# Patient Record
Sex: FEMALE | Race: BLACK OR AFRICAN AMERICAN | NOT HISPANIC OR LATINO | Employment: FULL TIME | URBAN - METROPOLITAN AREA
[De-identification: names, ages, dates, MRNs, and addresses within clinical notes are randomized per-mention and may not be internally consistent; named-entity substitution may affect disease eponyms.]

---

## 2020-01-13 ENCOUNTER — TELEPHONE (OUTPATIENT)
Dept: PSYCHIATRY | Facility: CLINIC | Age: 50
End: 2020-01-13

## 2020-01-13 NOTE — TELEPHONE ENCOUNTER
----- Message from Jada Srinivasan sent at 1/13/2020 10:50 AM CST -----  Contact: Pt   Pt called in regards to scheduling a appointment, Pt can be reached at [phone].

## 2020-01-17 ENCOUNTER — LAB VISIT (OUTPATIENT)
Dept: LAB | Facility: HOSPITAL | Age: 50
End: 2020-01-17
Attending: NURSE PRACTITIONER
Payer: COMMERCIAL

## 2020-01-17 ENCOUNTER — OFFICE VISIT (OUTPATIENT)
Dept: OBSTETRICS AND GYNECOLOGY | Facility: CLINIC | Age: 50
End: 2020-01-17
Payer: COMMERCIAL

## 2020-01-17 ENCOUNTER — PATIENT MESSAGE (OUTPATIENT)
Dept: OBSTETRICS AND GYNECOLOGY | Facility: CLINIC | Age: 50
End: 2020-01-17

## 2020-01-17 VITALS
SYSTOLIC BLOOD PRESSURE: 128 MMHG | BODY MASS INDEX: 31.24 KG/M2 | DIASTOLIC BLOOD PRESSURE: 86 MMHG | WEIGHT: 199.06 LBS | HEIGHT: 67 IN

## 2020-01-17 DIAGNOSIS — D21.9 FIBROIDS: ICD-10-CM

## 2020-01-17 DIAGNOSIS — N85.2 ENLARGED UTERUS: ICD-10-CM

## 2020-01-17 DIAGNOSIS — Z01.419 ENCOUNTER FOR GYNECOLOGICAL EXAMINATION WITHOUT ABNORMAL FINDING: Primary | ICD-10-CM

## 2020-01-17 DIAGNOSIS — N92.0 MENORRHAGIA WITH REGULAR CYCLE: ICD-10-CM

## 2020-01-17 DIAGNOSIS — Z12.31 ENCOUNTER FOR SCREENING MAMMOGRAM FOR BREAST CANCER: ICD-10-CM

## 2020-01-17 LAB
BASOPHILS # BLD AUTO: 0.01 K/UL (ref 0–0.2)
BASOPHILS NFR BLD: 0.2 % (ref 0–1.9)
DIFFERENTIAL METHOD: ABNORMAL
EOSINOPHIL # BLD AUTO: 0 K/UL (ref 0–0.5)
EOSINOPHIL NFR BLD: 0.2 % (ref 0–8)
ERYTHROCYTE [DISTWIDTH] IN BLOOD BY AUTOMATED COUNT: 16 % (ref 11.5–14.5)
HCG INTACT+B SERPL-ACNC: <1.2 MIU/ML
HCT VFR BLD AUTO: 37.9 % (ref 37–48.5)
HGB BLD-MCNC: 12.2 G/DL (ref 12–16)
IMM GRANULOCYTES # BLD AUTO: 0.01 K/UL (ref 0–0.04)
IMM GRANULOCYTES NFR BLD AUTO: 0.2 % (ref 0–0.5)
LYMPHOCYTES # BLD AUTO: 1.5 K/UL (ref 1–4.8)
LYMPHOCYTES NFR BLD: 34.5 % (ref 18–48)
MCH RBC QN AUTO: 27.9 PG (ref 27–31)
MCHC RBC AUTO-ENTMCNC: 32.2 G/DL (ref 32–36)
MCV RBC AUTO: 87 FL (ref 82–98)
MONOCYTES # BLD AUTO: 0.4 K/UL (ref 0.3–1)
MONOCYTES NFR BLD: 9.7 % (ref 4–15)
NEUTROPHILS # BLD AUTO: 2.4 K/UL (ref 1.8–7.7)
NEUTROPHILS NFR BLD: 55.4 % (ref 38–73)
NRBC BLD-RTO: 0 /100 WBC
PLATELET # BLD AUTO: 299 K/UL (ref 150–350)
PMV BLD AUTO: 9.7 FL (ref 9.2–12.9)
RBC # BLD AUTO: 4.37 M/UL (ref 4–5.4)
WBC # BLD AUTO: 4.35 K/UL (ref 3.9–12.7)

## 2020-01-17 PROCEDURE — 84443 ASSAY THYROID STIM HORMONE: CPT

## 2020-01-17 PROCEDURE — 99999 PR PBB SHADOW E&M-EST. PATIENT-LVL III: ICD-10-PCS | Mod: PBBFAC,,, | Performed by: NURSE PRACTITIONER

## 2020-01-17 PROCEDURE — 85025 COMPLETE CBC W/AUTO DIFF WBC: CPT

## 2020-01-17 PROCEDURE — 99999 PR PBB SHADOW E&M-EST. PATIENT-LVL III: CPT | Mod: PBBFAC,,, | Performed by: NURSE PRACTITIONER

## 2020-01-17 PROCEDURE — 88141 CYTOPATH C/V INTERPRET: CPT | Mod: ,,, | Performed by: PATHOLOGY

## 2020-01-17 PROCEDURE — 99386 PR PREVENTIVE VISIT,NEW,40-64: ICD-10-PCS | Mod: S$GLB,,, | Performed by: NURSE PRACTITIONER

## 2020-01-17 PROCEDURE — 88141 PR  CYTOPATH CERV/VAG INTERPRET: ICD-10-PCS | Mod: ,,, | Performed by: PATHOLOGY

## 2020-01-17 PROCEDURE — 84702 CHORIONIC GONADOTROPIN TEST: CPT

## 2020-01-17 PROCEDURE — 88142 CYTOPATH C/V THIN LAYER: CPT | Performed by: PATHOLOGY

## 2020-01-17 PROCEDURE — 99386 PREV VISIT NEW AGE 40-64: CPT | Mod: S$GLB,,, | Performed by: NURSE PRACTITIONER

## 2020-01-17 PROCEDURE — 36415 COLL VENOUS BLD VENIPUNCTURE: CPT

## 2020-01-17 NOTE — PATIENT INSTRUCTIONS
Breast Health: Breast Self-Awareness  What is breast self-awareness?  Breast self-awareness is knowing how your breasts normally look and feel. Your breasts change as you go through different stages of your life. So its important to learn what is normal for your breasts. Breast self-awareness helps you notice any changes in your breasts right away. Report any changes to your healthcare provider.  Why is breast self-awareness important?  Many experts now say that women should focus on breast self-awareness instead of doing a breast self-examination (BSE). These experts include the American Cancer Society, the U.S. Preventive Services Task Force, and the American Congress of Obstetricians and Gynecologists. Some experts even advise not teaching women to do a BSE. Thats because research hasnt shown a clear benefit to doing BSEs.  Breast self-awareness is different than a BSE. Breast self-awareness isnt about following a certain method and schedule. Its about knowing what's normal for your breasts. That way you can notice even small changes right away. If you see any changes, report them to your healthcare provider.  Changes to look for  Call your healthcare provider if you find any changes in your breasts that concern you. These changes may include:  · A lump  · Nipple discharge other than breast milk, especially a bloody discharge  · Swelling  · A change in size or shape  · Skin irritation, such as redness, thickening, or dimpling of the skin  · Swollen lymph nodes in the armpit  · Nipple problems, such as pain or redness  If you find a lump  Contact your provider if you find lumpiness in one breast, feel something different in the tissue, or feel a definite lump. Sometimes lumpiness may be due to menstrual changes. But there may be reason for concern.  Your provider may want to see you right away if you have:  · Nipple discharge that is bloody  · Skin changes on your breast, such as dimpling or puckering  Its  normal to be upset if you find a lump. But its important to contact your provider right away. Remember that most breast lumps are benign. This means they are not cancer.  Date Last Reviewed: 8/10/2015  © 0513-4509 [employer]. [address] [place] [address], [place] [address]. All rights reserved. This information is not intended as a substitute for professional medical care. Always follow your healthcare professional's instructions.        Dysfunctional Uterine Bleeding    Dysfunctional uterine bleeding is a condition in which bleeding is abnormal and occurs at unexpected times of the month. This happens because of changes in the hormones that help control a womans menstrual cycle each month.  The bleeding may be heavier or lighter than normal. If you have heavy bleeding often, this can lead to a problem called anemia. With anemia, your red blood cell count is too low. Red blood cells are needed because they help carry oxygen throughout your body. Severe anemia may cause you to look pale and feel very weak or tired. You might also become short of breath easily.  To treat dysfunctional uterine bleeding, medicines are often tried first. If these dont help, further testing and treatments may be needed. Discuss all of your options with your provider.  Home care  Medicines  If youre prescribed medicines, be sure to take them as directed. Some of the more common medicines you may be prescribed include:  · Hormone therapy (Options include most methods of hormonal birth control such as pills, shots, or a hormone-releasing IUD)  · Nonsteroidal anti-inflammatory drugs (NSAIDs), such as ibuprofen  · Iron supplements, if you have anemia     General care  · Get plenty of rest if you tire easily. Avoid heavy exertion.  · To help relieve pain or cramping that may occur with bleeding, try using a heating pad on the lower belly or back. A warm bath may also help.  Follow-up care  Follow up with your healthcare provider as  directed.  When to seek medical advice  Call your healthcare provider right away if:  · Bleeding becomes heavy (soaking 1 pad or tampon every hour for 3 hours)  · Increased abdominal pain  · Irregular bleeding worsens or does not get better even with treatment  · Fever of 100.4ºF (38ºC) or higher, or as directed by your provider  · Signs of anemia, such as pale skin, extreme fatigue or weakness, or shortness of breath  · Dizziness or fainting   Date Last Reviewed: 6/11/2015  © 4309-7220 [employer]. [address] [place] [address]. All rights reserved. This information is not intended as a substitute for professional medical care. Always follow your healthcare professional's instructions.        Heavy Menstrual Bleeding    Heavy menstrual bleeding means that your periods are heavier or longer than usual. You may soak through a pad or tampon every 1 to 3 hours on the heaviest days of your period. You may also pass large, dark clots. And your periods may last longer than 7 days.  If you have heavy periods often, this can cause a problem called anemia. With anemia, your red blood cell count is too low. Red blood cells are needed because they help carry oxygen throughout your body. Severe anemia may cause you to look pale and feel weak or tired. You might also become short of breath easily.  There are many possible causes of heavy menstrual bleeding. Hormonal imbalance is the most common cause. Having benign growths in your uterus, such as fibroids or polyps, is another cause. Taking certain medicines or having certain health problems or bleeding disorders are also causes.  To treat heavy menstrual bleeding, medicines are often tried first. If these dont help, further testing and treatments will likely be needed.  Home care  Medicines  If youre prescribed medicines, be sure to take them as directed.  · To help control heavy bleeding, any of the following may be used:  ¨ Hormone therapy (this  includes all methods of hormonal birth control such as pills, shots, cream, ring, patch, or hormone-releasing IUD)  ¨ Nonsteroidal anti-inflammatory drugs (NSAIDs), such as ibuprofen  ¨ Antifibrinolytic medicines, such as transexamic acid  · To help treat anemia, iron supplements may be prescribed.            General care  · Get plenty of rest if you tire easily. Avoid heavy exertion.  · To help relieve pain or cramping, try using a heating pad on the lower belly or back. A warm bath may also help.  Follow-up care  Follow up with your healthcare provider as directed.  When to seek medical advice  Call your healthcare provider right away if any of these occur:  · Heavier bleeding (soaking 1 pad or tampon every hour for 3 hours)  · Heavy bleeding that lasts longer than 1 week  · Fever of 100.4ºF (38ºC) or higher, or as directed by your provider  · Pain or cramping that gets worse instead of better  · Signs of anemia such as pale skin, extreme fatigue or weakness, or shortness of breath  · Dizziness or fainting  Date Last Reviewed: 6/11/2015 © 2000-2017 [employer]. [address] [place] [address]. All rights reserved. This information is not intended as a substitute for professional medical care. Always follow your healthcare professional's instructions.        The Range of Pap Test Results  When your Pap test is sent to the lab, the lab studies your cell samples and reports any abnormal cell changes. Your health care provider can discuss these changes with you. In some cases, an abnormal Pap test is due to an infection. More serious cell changes range from dysplasia to cancer. Talk to your health care provider about your Pap test.    Normal results  Cervical cells, even normal ones, are always changing. As they mature, normal squamous cells move from deeper layers within the cervix. Over time, these cells flatten and cover the surface of the cervix. Within the cervical canal, the cells are  different. These glandular cells are taller and not as flat as the cells on the surface of the cervix. When a Pap test sample shows healthy cells of both types, the results are negative. Keep having Pap tests as often as directed.  Abnormal results  A positive Pap test result means some cells in the sample showed abnormal changes. These results are grouped by the type of cell change and the location, or extent, of the changes. Depending on the results, you may need further testing.  · Inflammation: Noncancerous changes are present. They may be due to normal cell repair. Or, they may be caused by an infection, such as HPV or yeast. Further testing may be needed. (Also called reactive cellular changes.)  · Atypical squamous cells: Test results are unclear. Cells on the surface of the cervix show changes, but their significance is not yet known. Testing for HPV and other sexually transmitted infections (STIs) may be needed. Treatment may be required. (Reported as ASC-US or ASC-H.)  · Atypical glandular cells: Cells lining the cervical canal show abnormal changes. Further testing is likely. You may also have treatment to destroy or remove problem cells. (Reported as AGC.)  · Mild dysplasia: Cells show distinct changes. More testing or HPV typing may be done. You may also have treatment to destroy or remove problem cells. (Reported as low-grade OUMAR or VINCE 1.)  · Moderate to severe dysplasia: Cells show precancerous changes. Or, noninvasive cancer (carcinoma in situ) may be present. Treatment to destroy or remove problem cells is likely. (Reported as high-grade OUMAR or VINCE 2 or VINCE 3.)  · Cancer: Different types of cancer may be detected by your Pap test. More tests to assess the cancer's extent are likely. The type of treatment will depend on the test results and other factors, such as age and health history. (Reported as squamous cell carcinoma, endocervical adenocarcinoma in situ, or adenocarcinoma.)  Date Last  Reviewed: 5/12/2015  © 5096-0570 [employer]. [address] [place] [address]. All rights reserved. This information is not intended as a substitute for professional medical care. Always follow your healthcare professional's instructions.        Understanding Periods  Having a period is a normal, healthy part of becoming and being a woman. A period is the result of a cycle that takes place inside a girls body. This menstrual cycle makes it possible for women to have babies. The cycle begins with the first day of bleeding. In the middle of the cycle, ovulation occurs. This is when an egg is released and begins its journey from the ovary to the uterus.    An egg is released  · During each cycle, 1 egg grows and is released from an ovary. It finds its way to the fallopian tube.  The egg travels through a tube  · The egg moves through the fallopian tube toward the uterus. (If the egg and a mans sperm meet here, a woman becomes pregnant.)  The lining thickens  · The lining of the uterus grows thicker. This lining is made up of blood, tissue, and fluid. (The lining will nourish a growing baby during pregnancy.)  The egg and lining are shed  · About once a month, the egg and the lining of the uterus are shed through the vagina. This is called a period. (A period does not happen during pregnancy.)  Date Last Reviewed: 5/9/2015  © 0817-2256 [employer]. [address] [place] [address]. All rights reserved. This information is not intended as a substitute for professional medical care. Always follow your healthcare professional's instructions.        What Are Fibroids?  Fibroids are growths made up of connective tissue and muscle cells that usually form in the wall of your uterus. Other names for fibroids are myomas and leiomyomas. Fibroids are the most common tumor in women. They are almost always noncancerous (benign) and harmless. Fibroids start as pea-sized lumps, but can  grow steadily during your reproductive years. Many fibroids just need to be watched. Others may need treatment if they become too large or cause symptoms.    Potential problems  Fibroids often cause no symptoms. But a fibroid that grows quickly in your uterus can cause 1 or more of the following problems:  · Excessive uterine bleeding, leading to anemia (lack of red blood cells)  · Frequent urge to urinate  · Difficulty having bowel movements  · Achiness, heaviness, or fullness  · Back or abdominal pain  · Pain during intercourse  · Difficulty getting pregnant or being unable to get pregnant  · Problems with pregnancy  · Enlargement of the lower abdomen  Treatment is tailored for you  No 2 fibroids are the same. The type of treatment you will have depends on their number, size, location, and rate of growth. Your treatment decision also depends on the severity of your symptoms and whether or not you plan to have children in the future. There are a growing number of effective ways to treat fibroids. After your medical evaluation, your health care provider will be able to discuss with you the best options to solve your particular problem and meet your needs.  Your future checkups  Treating your fibroids is likely to relieve your symptoms. But your health care provider will want to check your progress. Ask your health care provider about any additional follow-up visits you might need.   Date Last Reviewed: 5/10/2015  © 1352-2674 [employer]. [address] [place] [address], [place] [address]. All rights reserved. This information is not intended as a substitute for professional medical care. Always follow your healthcare professional's instructions.        Uterine Fibroids    Fibroids are lumps (growths) of muscle tissue. They can form in the wall of uterus (womb). Fibroids are very common. They are almost always benign (noncancerous). It is not known what causes fibroids. But they may run in families. Also,  changes in female hormones may cause them to grow over time. After menopause, fibroids may stop growing, shrink, or go away.  Fibroids may or may not cause symptoms. This depends on many factors, such as the size, number, and locations of the fibroids. If symptoms do occur, they can include:  · Heavy bleeding (in severe cases, this may lead to a problem called anemia) or painful periods  · Feeling of fullness, pressure, or pain in the lower belly (abdomen) or pelvic region  · Lower back pain  · Frequent urination  · Constipation  · Pain during sex  · Problems getting pregnant  If fibroids are suspected, an evaluation will be done to help understand the extent of the problem. This can include a health history, exam, and tests. Based on the results, treatment can then be planned in needed.  Until more details are known about your problem, you may be given guidelines similar to the home care instructions below.      Home care  · To help control pain, over-the-counter pain medicine may be advised. Take these only as directed by your provider.  · If you have heavy or painful periods, keep a log of your menstrual cycle. Show the log to your provider. The information may help him or her determine if your symptoms are due to fibroids or another cause.  · Make certain lifestyle changes. This may include losing excess weight, being more active, or eating less red meat. These changes may help lower the risk of fibroids in some people. They may also help improve overall health.  Follow-up care  Follow up with your healthcare provider as advised. If testing was done, youll be told the results when they are ready. Treatment options for fibroids may include medicines or procedures to help shrink or keep fibroids from growing. In severe cases, surgery may be needed to remove fibroids or to remove the uterus. If you have fibroids but no symptoms, you may not need treatment at all. However, your provider may advise regular follow-up  to check fibroid size and growth.  When to seek medical advice  Call your healthcare provider right away if any of these occur:  · Heavy bleeding or painful periods that continue or dont get better with treatment  · Signs of anemia such as extreme fatigue, pale skin, shortness of breath with little exertion, or rapid heartbeat  · Weakness, dizziness, or fainting  · Severe pain in the pelvic or belly region  · Swollen or enlarged belly  Date Last Reviewed: 6/22/2015 © 2000-2017 [employer]. [address] [place] [address]. All rights reserved. This information is not intended as a substitute for professional medical care. Always follow your healthcare professional's instructions.        Understanding Uterine Bleeding  Your uterine bleeding may be heavy. Or you may have bleeding between periods. These problems may be caused by hormonal imbalance. Or they can be caused by uterine growths, an intrauterine device (IUD), bleeding disorder, or pregnancy.  Hormonal imbalance  Your menstrual cycle is controlled by hormones. The hormones include estrogen and progesterone. Sometimes there is too much or too little of 1 or both of these hormones. This can cause heavy periods. Or it can cause bleeding between periods. Causes of hormonal imbalance can include:  · Hormonal changes in teens and in women nearing menopause  · Diabetes, thyroid disease, or other medical problems  · Obesity  · Stress  · Strenuous exercise  · Anorexia (an eating disorder)  · Pregnancy  Uterine growths  There are different kinds of uterine growths. These include:  · Fibroids. These are round knots of muscle tissue in the uterus.  · Polyps. These are soft tissue growths in the uterine lining. They often hang into the uterus.  · Adenomyosis. This is when the uterine lining grows into the muscle wall.  · Hyperplasia. This is when the uterine lining gets too thick or grows too much.  · Endometrial cancer. This is uncontrolled  growth of part of the uterine lining.  Other causes of uterine bleeding  There are other causes of uterine bleeding. These include:  · IUD (intrauterine device). This is a method of birth control. Some IUDs contain hormones.  · Bleeding disorders. This is when the blood can't clot properly.  Treatment  Your health care provider can help diagnose the cause of your bleeding problem. He or she will work then work with you to plan treatment as needed.  Date Last Reviewed: 7/6/2015 © 2000-2017 [employer]. [address] [place] [address]. All rights reserved. This information is not intended as a substitute for professional medical care. Always follow your healthcare professional's instructions.        Problems Laparoscopic Hysterectomy Can Treat  Some health problems can cause pain or bleeding in the uterus. These problems can sometimes be helped by medicine. Or surgery may be done that keeps the uterus intact. But sometimes, the best way to relieve pain and bleeding is to remove the uterus using laparoscopic hysterectomy. Its done using small incisions.       The following are conditions hysterectomy can treat:  · Fibroids. A fibroid is a lump of muscle tissue. It grows in or on the wall of the uterus. It is not cancer. A fibroid can cause pain and heavy bleeding. It may press on the bladder or rectum. This can lead to frequent urination, constipation, and other problems.  · Endometriosis. Endometriosis is the growth of the uterine lining outside of the uterus. This tissue can lead to scarring (adhesions). This scarring occurs inside the pelvic cavity. This can cause severe pain.  · Gynecological cancer. Ovarian, fallopian tube, uterine and cervical cancers can be treated. Many times additional tissues are removed when cancer is involved.  · Endometrial hyperplasia. This is when the endometrium has abnormal cell changes that can precede cancer of the endometrium.  · Cervical dysplasia. This is  a change in the cervix that precedes certical cancer.  · Uterine prolapse. This is when the uterus drops from the normal location  Other Problems  Abnormal bleeding may be due to other causes. These include:  · Adenomyosis. This is the growth of the endometrium into the uterine muscle.  · Uterine polyps. These are fleshy growths of tissue from the uterine wall.  Date Last Reviewed: 3/1/2017  © 6091-9180 [employer]. [address] [place] [address], [place] [address]. All rights reserved. This information is not intended as a substitute for professional medical care. Always follow your healthcare professional's instructions.

## 2020-01-17 NOTE — PROGRESS NOTES
CC: Well woman exam    Kimmy Louis is a 49 y.o. female  presents for well woman exam.  LMP: Patient's last menstrual period was 2019 (approximate)..    Heavy bleeding for many years.  Has started skipping cycles, may go 2 months with no cycle.  Bleeding is 4-5 days, has bled for up to 2 weeks in past.  Day 1-2 is changing pad every hour.   Had D&C hysteroscopy in  with a polyp removal.   Has not seen gyn since .    Past Medical History:   Diagnosis Date    Diabetes      Past Surgical History:   Procedure Laterality Date    DILATION AND CURETTAGE OF UTERUS      at [facility] - hysterscopy D&C - polyps removed     Social History     Socioeconomic History    Marital status:      Spouse name: Not on file    Number of children: Not on file    Years of education: Not on file    Highest education level: Not on file   Occupational History    Not on file   Social Needs    Financial resource strain: Not on file    Food insecurity:     Worry: Not on file     Inability: Not on file    Transportation needs:     Medical: Not on file     Non-medical: Not on file   Tobacco Use    Smoking status: Former Smoker     Packs/day: 0.50     Years: 10.00     Pack years: 5.00   Substance and Sexual Activity    Alcohol use: Yes     Alcohol/week: 0.0 standard drinks     Frequency: Monthly or less    Drug use: No    Sexual activity: Not Currently   Lifestyle    Physical activity:     Days per week: Not on file     Minutes per session: Not on file    Stress: Not on file   Relationships    Social connections:     Talks on phone: Not on file     Gets together: Not on file     Attends Taoism service: Not on file     Active member of club or organization: Not on file     Attends meetings of clubs or organizations: Not on file     Relationship status: Not on file   Other Topics Concern    Not on file   Social History Narrative         Family History   Problem Relation Age of Onset     "Colon cancer Sister 63    Diabetes Mother     Diabetes Father     Hypertension Father     Hyperlipidemia Father     Leukemia Father      OB History        1    Para   1    Term   1            AB        Living   1       SAB        TAB        Ectopic        Multiple        Live Births                     /86   Ht 5' 7" (1.702 m)   Wt 90.3 kg (199 lb 1.2 oz)   LMP 2019 (Approximate)   BMI 31.18 kg/m²       ROS:  GENERAL: Denies weight gain or weight loss. Feeling well overall.   SKIN: Denies rash or lesions.   HEAD: Denies head injury or headache.   NODES: Denies enlarged lymph nodes.   CHEST: Denies chest pain or shortness of breath.   CARDIOVASCULAR: Denies palpitations or left sided chest pain.   ABDOMEN: No abdominal pain, constipation, diarrhea, nausea, vomiting or rectal bleeding.   URINARY: No frequency, dysuria, hematuria, or burning on urination.  REPRODUCTIVE: See HPI.   BREASTS: The patient performs breast self-examination and denies pain, lumps, or nipple discharge.   HEMATOLOGIC: No easy bruisability or excessive bleeding.   MUSCULOSKELETAL: Denies joint pain or swelling.   NEUROLOGIC: Denies syncope or weakness.   PSYCHIATRIC: Denies depression, anxiety or mood swings.    PHYSICAL EXAM:  APPEARANCE: Well nourished, well developed, in no acute distress.  AFFECT: WNL, alert and oriented x 3  SKIN: No acne or hirsutism  NECK: Neck symmetric without masses or thyromegaly  NODES: No inguinal, cervical, axillary, or femoral lymph node enlargement  CHEST: Good respiratory effect  ABDOMEN: Soft.  No tenderness or masses.  No hepatosplenomegaly.  No hernias.  BREASTS: Symmetrical, no skin changes or visible lesions.  No palpable masses, nipple discharge bilaterally.  PELVIC: Normal external genitalia without lesions.  Normal hair distribution.  Adequate perineal body, normal urethral meatus.  Vagina moist and well rugated without lesions or discharge.  Cervix pink, without " lesions, discharge or tenderness.  No significant cystocele or rectocele.  Bimanual exam shows uterus to be 18 -20 weeks size, irregular with fibroids, mobile and nontender.  Adnexa without masses or tenderness.    EXTREMITIES: No edema.  Physical Exam    1. Encounter for gynecological examination without abnormal finding  Liquid-Based Pap Smear, Screening   2. Menorrhagia with regular cycle  CBC auto differential    TSH    hCG, quantitative    US Pelvis Comp with Transvag NON-OB (xpd   3. Encounter for screening mammogram for breast cancer  Mammo Digital Screening Bilat With CAD   4. Fibroids  CBC auto differential    US Pelvis Comp with Transvag NON-OB (xpd   5. Enlarged uterus  CBC auto differential    US Pelvis Comp with Transvag NON-OB (xpd    AND PLAN:    Patient was counseled today on A.C.S. Pap guidelines and recommendations for yearly pelvic exams, mammograms and monthly self breast exams; to see her PCP for other health maintenance.     Check labs, u/s and f/u with gyn md

## 2020-01-18 LAB — TSH SERPL DL<=0.005 MIU/L-ACNC: 0.92 UIU/ML (ref 0.4–4)

## 2020-01-21 ENCOUNTER — PATIENT MESSAGE (OUTPATIENT)
Dept: OBSTETRICS AND GYNECOLOGY | Facility: CLINIC | Age: 50
End: 2020-01-21

## 2020-01-31 ENCOUNTER — TELEPHONE (OUTPATIENT)
Dept: RADIOLOGY | Facility: HOSPITAL | Age: 50
End: 2020-01-31

## 2020-02-03 ENCOUNTER — HOSPITAL ENCOUNTER (OUTPATIENT)
Dept: RADIOLOGY | Facility: HOSPITAL | Age: 50
Discharge: HOME | End: 2020-02-03
Attending: NURSE PRACTITIONER
Payer: COMMERCIAL

## 2020-02-03 VITALS — HEIGHT: 67 IN | BODY MASS INDEX: 31.24 KG/M2 | WEIGHT: 199.06 LBS

## 2020-02-03 DIAGNOSIS — Z12.31 ENCOUNTER FOR SCREENING MAMMOGRAM FOR BREAST CANCER: ICD-10-CM

## 2020-02-03 DIAGNOSIS — N85.2 ENLARGED UTERUS: ICD-10-CM

## 2020-02-03 DIAGNOSIS — D21.9 FIBROIDS: ICD-10-CM

## 2020-02-03 DIAGNOSIS — N92.0 MENORRHAGIA WITH REGULAR CYCLE: ICD-10-CM

## 2020-02-03 PROCEDURE — 76856 US PELVIS COMP WITH TRANSVAG NON-OB (XPD): ICD-10-PCS | Mod: 26,,, | Performed by: RADIOLOGY

## 2020-02-03 PROCEDURE — 77067 MAMMO DIGITAL SCREENING BILAT WITH TOMOSYNTHESIS_CAD: ICD-10-PCS | Mod: 26,,, | Performed by: RADIOLOGY

## 2020-02-03 PROCEDURE — 77063 BREAST TOMOSYNTHESIS BI: CPT | Mod: 26,,, | Performed by: RADIOLOGY

## 2020-02-03 PROCEDURE — 77067 SCR MAMMO BI INCL CAD: CPT | Mod: TC

## 2020-02-03 PROCEDURE — 76830 US PELVIS COMP WITH TRANSVAG NON-OB (XPD): ICD-10-PCS | Mod: 26,,, | Performed by: RADIOLOGY

## 2020-02-03 PROCEDURE — 76830 TRANSVAGINAL US NON-OB: CPT | Mod: 26,,, | Performed by: RADIOLOGY

## 2020-02-03 PROCEDURE — 76856 US EXAM PELVIC COMPLETE: CPT | Mod: 26,,, | Performed by: RADIOLOGY

## 2020-02-03 PROCEDURE — 77067 SCR MAMMO BI INCL CAD: CPT | Mod: 26,,, | Performed by: RADIOLOGY

## 2020-02-03 PROCEDURE — 76856 US EXAM PELVIC COMPLETE: CPT | Mod: TC

## 2020-02-03 PROCEDURE — 77063 MAMMO DIGITAL SCREENING BILAT WITH TOMOSYNTHESIS_CAD: ICD-10-PCS | Mod: 26,,, | Performed by: RADIOLOGY

## 2020-02-07 ENCOUNTER — OFFICE VISIT (OUTPATIENT)
Dept: OBSTETRICS AND GYNECOLOGY | Facility: CLINIC | Age: 50
End: 2020-02-07
Payer: COMMERCIAL

## 2020-02-07 VITALS
BODY MASS INDEX: 31.38 KG/M2 | WEIGHT: 199.94 LBS | DIASTOLIC BLOOD PRESSURE: 78 MMHG | SYSTOLIC BLOOD PRESSURE: 124 MMHG | HEIGHT: 67 IN

## 2020-02-07 DIAGNOSIS — D21.9 FIBROIDS: Primary | ICD-10-CM

## 2020-02-07 PROCEDURE — 99999 PR PBB SHADOW E&M-EST. PATIENT-LVL III: CPT | Mod: PBBFAC,,, | Performed by: OBSTETRICS & GYNECOLOGY

## 2020-02-07 PROCEDURE — 99999 PR PBB SHADOW E&M-EST. PATIENT-LVL III: ICD-10-PCS | Mod: PBBFAC,,, | Performed by: OBSTETRICS & GYNECOLOGY

## 2020-02-07 PROCEDURE — 3008F PR BODY MASS INDEX (BMI) DOCUMENTED: ICD-10-PCS | Mod: CPTII,S$GLB,, | Performed by: OBSTETRICS & GYNECOLOGY

## 2020-02-07 PROCEDURE — 99213 OFFICE O/P EST LOW 20 MIN: CPT | Mod: S$GLB,,, | Performed by: OBSTETRICS & GYNECOLOGY

## 2020-02-07 PROCEDURE — 99213 PR OFFICE/OUTPT VISIT, EST, LEVL III, 20-29 MIN: ICD-10-PCS | Mod: S$GLB,,, | Performed by: OBSTETRICS & GYNECOLOGY

## 2020-02-07 PROCEDURE — 3008F BODY MASS INDEX DOCD: CPT | Mod: CPTII,S$GLB,, | Performed by: OBSTETRICS & GYNECOLOGY

## 2020-02-07 NOTE — PROGRESS NOTES
Subjective:       Patient ID: Kimmy Louis is a 49 y.o. female.    Chief Complaint:  Follow-up      History of Present Illness  HPI  Uterine Fibroids  Patient was referred by NP for evaluation of uterine fibroids. Periods are mostly regular every 28-30 days, lasting 7 days. Pt has skipped occasional months and reports one period lasting up to two weeks recently.  Dysmenorrhea:moderate, occurring throughout menses. Cyclic symptoms include none.  Cycles are heavy in flow.  Last pap in process.    GYN & OB History  Patient's last menstrual period was 2020 (exact date).   Date of Last Pap: No result found    OB History    Para Term  AB Living   1 1 1     1   SAB TAB Ectopic Multiple Live Births                  # Outcome Date GA Lbr Grant/2nd Weight Sex Delivery Anes PTL Lv   1 Term                Review of Systems  Review of Systems   Constitutional: Negative for activity change, appetite change, chills, fatigue, fever and unexpected weight change.   Respiratory: Negative for shortness of breath.    Cardiovascular: Negative for chest pain, palpitations and leg swelling.   Gastrointestinal: Negative for abdominal pain, bloating, blood in stool, constipation, diarrhea, nausea and vomiting.   Genitourinary: Positive for menorrhagia and menstrual problem. Negative for dysmenorrhea, dyspareunia, dysuria, flank pain, frequency, genital sores, hematuria, pelvic pain, urgency, vaginal bleeding, vaginal discharge, vaginal pain, urinary incontinence, postcoital bleeding, vaginal dryness and vaginal odor.   Musculoskeletal: Positive for back pain.   Neurological: Negative for syncope and headaches.           Objective:    Physical Exam:   Constitutional: She is oriented to person, place, and time. She appears well-developed and well-nourished. No distress.                           Neurological: She is alert and oriented to person, place, and time.     Psychiatric: She has a normal mood and affect. Her  behavior is normal. Thought content normal.          US pelvis 2/3/20:  Uterus: Size: Enlarged, measuring 16.3 x 8.8 x 11.2 cm Masses: Multiple heterogeneous fibroids throughout.  Largest lesion in the lower uterine segment measures 7.0 cm.  Incidental nabothian cyst in the cervix. Endometrium: 16.3 mm.  Limited visualization due to distortion by adjacent fibroids.  Right ovary: Size: 3.5 x 2.8 x 2.8 cm Appearance: Simple appearing cyst measuring 2.5 cm, visualized only transabdominally. Vascular flow: Normal.  Left ovary: Not visualized.  Free Fluid: None.     Assessment:        1. Fibroids             Plan:      Fibroids  -     Pt was counseled on fibroids, including common signs and symptoms.  Symptoms are disruptive.  Pt is done with her fertility and has no desire for future childbearing.  Treatment options were reviewed with pt, including expectant vs medical (hormonal contraceptives, Lupron) vs fibroid embolization vs myomectomy vs hysterectomy.  Pt is a candidate for hysterectomy if she chooses.  Pt voiced understanding and desires to desires to think about options.  Pt will call back with her decision.      Follow up in about 3 months (around 5/7/2020).

## 2020-02-07 NOTE — PATIENT INSTRUCTIONS
What Are Fibroids?  Fibroids are growths made up of connective tissue and muscle cells that usually form in the wall of your uterus. Other names for fibroids are myomas and leiomyomas. Fibroids are the most common tumor in women. They are almost always noncancerous (benign) and harmless. Fibroids start as pea-sized lumps, but can grow steadily during your reproductive years. Many fibroids just need to be watched. Others may need treatment if they become too large or cause symptoms.    Potential problems  Fibroids often cause no symptoms. But a fibroid that grows quickly in your uterus can cause 1 or more of the following problems:  · Excessive uterine bleeding, leading to anemia (lack of red blood cells)  · Frequent urge to urinate  · Difficulty having bowel movements  · Achiness, heaviness, or fullness  · Back or abdominal pain  · Pain during intercourse  · Difficulty getting pregnant or being unable to get pregnant  · Problems with pregnancy  · Enlargement of the lower abdomen  Treatment is tailored for you  No 2 fibroids are the same. The type of treatment you will have depends on their number, size, location, and rate of growth. Your treatment decision also depends on the severity of your symptoms and whether or not you plan to have children in the future. There are a growing number of effective ways to treat fibroids. After your medical evaluation, your health care provider will be able to discuss with you the best options to solve your particular problem and meet your needs.  Your future checkups  Treating your fibroids is likely to relieve your symptoms. But your health care provider will want to check your progress. Ask your health care provider about any additional follow-up visits you might need.   Date Last Reviewed: 5/10/2015  © 4445-9554 [employer]. [address] [place] [address], [place] [address]. All rights reserved. This information is not intended as a substitute for professional medical  care. Always follow your healthcare professional's instructions.        Types of Laparoscopic Hysterectomy  There are several types of laparoscopic hysterectomy. Depending on your needs, all or part of the uterus may be removed. In some cases, the cervix, ovaries, or fallopian tubes are also removed. Your surgeon will discuss the options with you before surgery.    Total hysterectomy  A total hysterectomy means that the entire uterus is removed. It may be removed through the vagina. Or, it may be removed in pieces through small incisions in the abdomen.    Hysterectomy with removal of ovaries  In this procedure, the uterus, ovaries, and fallopian tubes are removed. The organs may be removed through the vagina or in pieces through small incisions in the abdomen.    Laparoscopic supracervical hysterectomy (LSH)  With this procedure, the top portion of the uterus is removed. The cervix is left in place and may be closed at top. This procedure may be done if the cervix is healthy. The uterus is removed in pieces through small incisions in the abdomen. The ovaries and tubes may be removed during this type of hysterectomy if desired.  Date Last Reviewed: 3/1/2017  © 3325-5208 [employer]. [address] [place] [address]. All rights reserved. This information is not intended as a substitute for professional medical care. Always follow your healthcare professional's instructions.        Understanding Laparoscopic Hysterectomy  Having your uterus (womb) removed is called a hysterectomy. Using a technique called laparoscopy has many benefits. You may spend less time in the hospital and recover faster.  What is hysterectomy?  Hysterectomy removes the uterus. Part or all of the uterus may be taken out. Certain other organs may be removed at the same time. Having your uterus removed means that you wont be able to get pregnant in the future.  What is laparoscopy?  Laparoscopy is a type of surgery. A long,  lighted tube with a camera is used. This is called a laparoscope. The scope sends pictures of the inside of the body to a video screen. For the surgery, a few small incisions are made in the abdomen. The scope is inserted through one of the small incisions. Surgical tools are inserted through the other incisions to complete the procedure.  Benefits of laparoscopy  This procedure lets you avoid open surgery. Open surgery requires a larger incision in the abdomen. Compared to open surgery laparoscopy may:  · Require less time in the hospital or surgery center  · Offer a faster recovery  · Cause less internal scarring and smaller visible scars  · Cause less pain after surgery  · Have a lower risk of complications  Risks and possible complications of laparoscopic hysterectomy  · Side effects from anesthesia  · Infection  · Bleeding, with a possible need for a transfusion  · Blood clots  · Damage to the bladder, bowel, ureters, or nearby nerves  · Hernia  · Formation of scar tissue that can cause pain or bowel obstruction often times years later  · Need for a second surgery  Date Last Reviewed: 3/1/2017  © 1408-6966 [employer]. [address] [place] [address]. All rights reserved. This information is not intended as a substitute for professional medical care. Always follow your healthcare professional's instructions.        Laparoscopic Hysterectomy: Your Experience  Talk to your healthcare provider about how to get ready for your surgery. Your healthcare providers will talk with you about what to expect as surgery draws near. Keep in mind that your experience may differ from that of other women you know.  Before the day of surgery  Your instructions may include the following:  · Stop taking certain medicines (including aspirin) for as many days before surgery as directed.  · If you smoke, stop as long as possible before surgery.  · Do not eat or drink anything after midnight the night before  surgery. This includes chewing gum and mints.  · Arrange ahead of time for a ride home from the hospital or surgery center.  · If it is prescribed, take medicine to clean out your bowels the day before surgery. Your healthcare provider can give you more details about this.  On the day of surgery  Youll change into a gown. Youll then be prepped for your procedure:  · The anesthesiologist or nurse anesthetist will discuss anesthesia with you and answer any questions you have.  · Some pubic hair may be shaved.  · An IV (intravenous) line will be put into your arm or hand. This line supplies you with medicines and fluids before, during, and after surgery.  · You may be given medicine that helps you relax. You will then be given general anesthesia to make you sleep and keep you free from pain during surgery.  Risks and complications of laparoscopic hysterectomy  Once you understand these risks, you will be asked to sign a consent form. Risks and possible complications include:  · Side effects from anesthesia  · Infection  · Bleeding, with a possible need for a transfusion  · Blood clots  · Damage to the bladder, bowel, ureters, or nearby nerves or blood vessels  · Formation of scar tissue that may cause pain or bowel obstruction in the future (more common with abdominal approach)  · Need for a second surgery   Date Last Reviewed: 3/1/2017  © 3054-6247 [employer]. [address] [place] [address], [place] [address]. All rights reserved. This information is not intended as a substitute for professional medical care. Always follow your healthcare professional's instructions.        Myomectomy  Myomectomy is a surgical procedure to remove uterine fibroids. This procedure may preserve your uterus and your ability to have children.  Before your surgery  Depending on which procedure you and your healthcare provider choose, you may be asked to do the following:  · Have tests that your health care provider has  ordered.  · Stop eating and drinking at midnight before your surgery, or as recommended by your healthcare provider.  · Take medicines as prescribed by your healthcare provider to shrink fibroids.  · Stop taking aspirin or ibuprofen 1 week before surgery. Tell your healthcare provider what other medicines you take and ask whether you should stop them.  · Arrange for a ride home after surgery.    Types of myomectomy procedures  Abdominal  Your healthcare provider makes incisions in your stomach and uterus to remove the fibroids. Then the uterus is repaired and the incisions are closed.  Laparoscopic  Your healthcare provider inserts a laparoscope and other surgical instruments through half-inch incisions in your stomach. One or more incisions are made in your uterus to remove the fibroids. Then the uterus is repaired and the incisions are closed.  Hysteroscopic  A hysteroscope is inserted into the vagina. An instrument is used to remove the fibroids from your uterus.  Call your healthcare provider  Contact your healthcare provider right away if you have any of the following:  · Severe or increasing pain  · Fever or chills  · Nausea or vomiting  · Redness or swelling around your incision  · Persistent or heavy vaginal bleeding   Date Last Reviewed: 2/1/2017 © 2000-2017 [employer]. [address] [place] [address]. All rights reserved. This information is not intended as a substitute for professional medical care. Always follow your healthcare professional's instructions.        Uterine Fibroid Embolization    Uterine fibroid embolization is a procedure that is done to shrink a fibroid. Fibroids are benign (not cancerous) growths of muscle tissue on or inside the uterus. This procedure stops the fibroids blood supply. It is often done by a specially trained doctor called an interventional radiologist.  Before your procedure  To get ready for your procedure:  · Follow any directions youre  given for not eating or drinking before the procedure.  · Tell your healthcare provider if you are allergic to any foods, medicines, or X-ray dye (contrast medium).  Tell your provider about any medicines you are taking. You may need to stop taking all or some of these before the procedure. This includes:  · All prescription medicines  · Over-the-counter medicines such as aspirin or ibuprofen  · Street drugs  · Herbs, vitamins, and other supplements  During your procedure  · You will lie on an X-ray table. An IV or intravenous line is put into a vein in your arm or hand. This line gives you fluids and medicines. You may be given medicine to relax you and make you sleepy.  · Medicine will be put on the skin on your groin to numb it. Then a small cut or incision is made. A needle attached to a thin wire is put through the incision. The wire is put into a blood vessel near your groin.  · A thin, flexible tube called a catheter is placed over the guide wire into the blood vessel.  · X-ray dye is injected through the catheter. This helps the blood vessels and catheter show up better on X-ray images. The catheters movement can be seen on a computer screen.  · The radiologist uses X-ray images as a guide. He or she moves the catheter through the blood vessel. It is moved into the artery that supplies blood to your uterus.  · The catheter is moved near the fibroid. The radiologist then injects tiny grains of plastic or spongy material into the artery. These grains flow to the smaller blood vessels that supply the fibroid. Blood flow to these vessels is blocked. The procedure is done again on the other side of your uterus.  · The entire procedure takes about 1 to 2 hours.  After your procedure  You may stay in the hospital overnight. You will likely feel pain and cramping for up to 1 week. Medicines will be prescribed to help control this pain. Some vaginal bleeding, also called spotting, is common for a few days. You may  feel tired and have an upset stomach and a fever for a few days after the procedure. During your recovery, care for the incision as directed. You may be able to return to work 1 to 2 weeks after the procedure. Your healthcare provider will tell you more.  Possible risks and complications  All procedures have some risk. Possible risks of uterine fibroid embolization include:  · Infection or bruising around the catheter insertion site  · Blood clot in a blood vessel  · Problems because of X-ray dye. These include allergic reaction or kidney damage.  · Not being able to have a baby (infertility), or going into early menopause.  · Injury to the uterus. This might require a procedure to remove tissue from the uterus (dilation and curettage). Or the uterus itself might need to be removed (a hysterectomy).  · Exposure to X-ray radiation. This is typically low level and considered safe.  · Fibroid symptoms may come back. Or new fibroids can develop in the future.  Experts don't know how this procedure affects a woman's ability to have a baby (fertility) in the long term. They also don't know how it affects future pregnancies.   Date Last Reviewed: 6/12/2015  © 7120-4669 [employer]. [address] [place] [address], [place] [address]. All rights reserved. This information is not intended as a substitute for professional medical care. Always follow your healthcare professional's instructions.

## 2020-02-10 ENCOUNTER — TELEPHONE (OUTPATIENT)
Dept: OBSTETRICS AND GYNECOLOGY | Facility: CLINIC | Age: 50
End: 2020-02-10

## 2020-02-10 NOTE — TELEPHONE ENCOUNTER
----- Message from Dalton Pryor sent at 2/10/2020  8:20 AM CST -----  Contact: pt   Pt would like cb  to schedule procedure.       [phone] or [phone] (work number)

## 2020-02-12 NOTE — TELEPHONE ENCOUNTER
Called and spoke with pt.  Pt reports that she desires to proceed with hysterectomy.  Pt was again counseled on options.  Desires to proceed with RALH with ovarian preservation.  Procedure details, indications, risks, benefits, and alternatives were reviewed with pt.  Will have Marisol contact pt to schedule RALH.

## 2020-02-13 DIAGNOSIS — D21.9 FIBROIDS: Primary | ICD-10-CM

## 2020-02-13 DIAGNOSIS — N92.0 MENORRHAGIA WITH REGULAR CYCLE: ICD-10-CM

## 2020-02-20 ENCOUNTER — PATIENT MESSAGE (OUTPATIENT)
Dept: OBSTETRICS AND GYNECOLOGY | Facility: CLINIC | Age: 50
End: 2020-02-20

## 2020-02-26 ENCOUNTER — HOSPITAL ENCOUNTER (OUTPATIENT)
Dept: PREADMISSION TESTING | Facility: HOSPITAL | Age: 50
Discharge: HOME | End: 2020-02-26
Attending: OBSTETRICS & GYNECOLOGY
Payer: COMMERCIAL

## 2020-02-26 ENCOUNTER — HOSPITAL ENCOUNTER (OUTPATIENT)
Dept: CARDIOLOGY | Facility: HOSPITAL | Age: 50
Discharge: HOME | End: 2020-02-26
Attending: ANESTHESIOLOGY
Payer: COMMERCIAL

## 2020-02-26 ENCOUNTER — OFFICE VISIT (OUTPATIENT)
Dept: OBSTETRICS AND GYNECOLOGY | Facility: CLINIC | Age: 50
End: 2020-02-26
Payer: COMMERCIAL

## 2020-02-26 VITALS
BODY MASS INDEX: 31.66 KG/M2 | DIASTOLIC BLOOD PRESSURE: 52 MMHG | HEIGHT: 67 IN | WEIGHT: 201.75 LBS | SYSTOLIC BLOOD PRESSURE: 110 MMHG

## 2020-02-26 VITALS — HEIGHT: 67 IN | WEIGHT: 200 LBS | BODY MASS INDEX: 31.39 KG/M2

## 2020-02-26 DIAGNOSIS — D21.9 FIBROIDS: Primary | ICD-10-CM

## 2020-02-26 DIAGNOSIS — Z01.818 PREOP TESTING: ICD-10-CM

## 2020-02-26 DIAGNOSIS — Z01.818 PREOP TESTING: Primary | ICD-10-CM

## 2020-02-26 PROCEDURE — 99999 PR PBB SHADOW E&M-EST. PATIENT-LVL III: ICD-10-PCS | Mod: PBBFAC,,, | Performed by: OBSTETRICS & GYNECOLOGY

## 2020-02-26 PROCEDURE — 93010 EKG 12-LEAD: ICD-10-PCS | Mod: ,,, | Performed by: INTERNAL MEDICINE

## 2020-02-26 PROCEDURE — 93010 ELECTROCARDIOGRAM REPORT: CPT | Mod: ,,, | Performed by: INTERNAL MEDICINE

## 2020-02-26 PROCEDURE — 99999 PR PBB SHADOW E&M-EST. PATIENT-LVL III: CPT | Mod: PBBFAC,,, | Performed by: OBSTETRICS & GYNECOLOGY

## 2020-02-26 PROCEDURE — 99499 UNLISTED E&M SERVICE: CPT | Mod: S$GLB,,, | Performed by: OBSTETRICS & GYNECOLOGY

## 2020-02-26 PROCEDURE — 93005 ELECTROCARDIOGRAM TRACING: CPT

## 2020-02-26 PROCEDURE — 99499 NO LOS: ICD-10-PCS | Mod: S$GLB,,, | Performed by: OBSTETRICS & GYNECOLOGY

## 2020-02-26 RX ORDER — SODIUM CHLORIDE 9 MG/ML
INJECTION, SOLUTION INTRAVENOUS CONTINUOUS
Status: CANCELLED | OUTPATIENT
Start: 2020-02-26

## 2020-02-26 NOTE — PROGRESS NOTES
Pt is here for pre-operative visit.  Pt reports no complaints.  Ready for surgery.    ROS Negative     Physical Exam:  See H+P    A/P:  Fibroids  -     Procedure risks, benefits, and alternatives were discussed.  Pt voiced understanding and expressed consent for RALH/Bilateral Salpingectomy.  Pt desires ovarian preservation but is OK with ovarian removal if deemed necessary.  Hospital forms were reviewed with pt and signed.  Pre-operative instructions were given.

## 2020-02-26 NOTE — PRE ADMISSION SCREENING
To confirm, Your doctor has instructed you that surgery is scheduled for 03/02/20 at  0950am.       Please report to [facility], [address], 1st floor, main lobby by 0820am.  Pre admit office will call afternoon prior to surgery with final arrival time    INSTRUCTIONS IMPORTANT!!!   Do not eat  or smoke after 12 midnight, prior to surgery, but may have water/apple juice 3 hours prior to surgery. OK to brush teeth, no gum, candy or mints!      ¨ Take only these medicines with a small swallow of water-morning of surgery.  N/A    Pre operative instructions:  Please review the Pre-Operative Instruction booklet that you were given.        Bathing Instructions--See page 6 in the Pre-operative booklet.      Prevention of surgical site infections:     -Keep incisions clean and dry.   -Do not soak/submerge incisions in water until completely healed.   -Do not apply lotions, powders, creams, or deodorants to site.   -Always make sure hands are cleaned with antibacterial soap/ alcohol-based                 prior to touching the surgical site.  (This includes doctors,                 nurses, staff, and yourself.)    Signs and symptoms:   -Redness and pain around the area where you had surgery   -Drainage of cloudy fluid from your surgical wound   -Fever over 100.4       I have read or had read and explained to me, and understand the above information.  Additional comments or instructions:  Received a copy of Pre-operative instructions booklet, FAQ surgical site infection sheet, and packets of hibiclens (if indicated).

## 2020-02-26 NOTE — H&P
[facility]  Obstetrics & Gynecology  History & Physical    Patient Name: Kimmy Louis  MRN: [id number]  Admission Date: (Not on file)  Primary Care Provider: Soto Caro MD    Subjective:     Chief Complaint/Reason for Admission: surgery    History of Present Illness:   50 yo  with symptomatic fibroids is here for scheduled hysterectomy.  Pt reports no new complaints and is ready for surgery.    Current Outpatient Medications on File Prior to Visit   Medication Sig    glimepiride (AMARYL) 1 MG tablet Take 1 mg by mouth before breakfast.    metformin (GLUCOPHAGE) 1000 MG tablet Take 1,000 mg by mouth 2 (two) times daily with meals.     No current facility-administered medications on file prior to visit.        Review of patient's allergies indicates:  No Known Allergies    Past Medical History:   Diagnosis Date    Diabetes      OB History    Para Term  AB Living   1 1 1     1   SAB TAB Ectopic Multiple Live Births                  # Outcome Date GA Lbr Grant/2nd Weight Sex Delivery Anes PTL Lv   1 Term              Past Surgical History:   Procedure Laterality Date    DILATION AND CURETTAGE OF UTERUS      at [facility] - hysterscopy D&C - polyps removed     Family History     Problem Relation (Age of Onset)    Colon cancer Sister (63)    Diabetes Mother, Father    Hyperlipidemia Father    Hypertension Father    Leukemia Father        Tobacco Use    Smoking status: Former Smoker     Packs/day: 0.50     Years: 10.00     Pack years: 5.00   Substance and Sexual Activity    Alcohol use: Not Currently     Alcohol/week: 0.0 standard drinks     Frequency: Monthly or less    Drug use: No    Sexual activity: Not Currently     Review of Systems   Constitutional: Negative for activity change, appetite change, chills, fatigue, fever and unexpected weight change.   Respiratory: Negative for shortness of breath.    Cardiovascular: Negative for chest pain, palpitations and leg swelling.    Gastrointestinal: Negative for abdominal pain, bloating, blood in stool, constipation, diarrhea, nausea and vomiting.   Genitourinary: Positive for menorrhagia and menstrual problem. Negative for dysmenorrhea, dyspareunia, dysuria, flank pain, frequency, genital sores, hematuria, pelvic pain, urgency, vaginal bleeding, vaginal discharge, vaginal pain, urinary incontinence, postcoital bleeding, vaginal dryness and vaginal odor.   Musculoskeletal: Positive for back pain.   Integumentary:  Negative for breast mass, nipple discharge, breast skin changes and breast tenderness.   Neurological: Negative for syncope and headaches.   Breast: Negative for asymmetry, lump, mass, mastodynia, nipple discharge, skin changes and tenderness    Objective:     Vital Signs (Most Recent):  BP: (!) 110/52 (02/26/20 0954) Vital Signs (24h Range):  [unfilled]     Weight: 91.5 kg (201 lb 11.5 oz)  Body mass index is 31.59 kg/m².  Patient's last menstrual period was 02/17/2020 (exact date).    Physical Exam:   Constitutional: She is oriented to person, place, and time. She appears well-developed and well-nourished. No distress.    HENT:   Head: Normocephalic and atraumatic.    Eyes: Pupils are equal, round, and reactive to light. EOM are normal.    Neck: Normal range of motion.    Cardiovascular: Normal rate, regular rhythm and normal heart sounds.     Pulmonary/Chest: Effort normal and breath sounds normal.        Abdominal: Soft. Bowel sounds are normal. She exhibits no distension. There is no tenderness.             Musculoskeletal: Normal range of motion and moves all extremeties. She exhibits no edema or tenderness.       Neurological: She is alert and oriented to person, place, and time.    Skin: Skin is warm and dry.    Psychiatric: She has a normal mood and affect. Her behavior is normal. Thought content normal.       Laboratory:  I have personallly reviewed all pertinent lab results from the last 24 hours.    Diagnostic  Results:  Labs: Reviewed  US: Reviewed  Pap reviewed    Assessment/Plan:     There are no hospital problems to display for this patient.    Fibroids  -     Procedure risks, benefits, and alternatives were discussed.  Pt voiced understanding and expressed consent for RALH/Bilateral Salpingectomy.  Pt desires ovarian preservation but is OK with ovarian removal if deemed necessary.  Hospital forms were reviewed with pt and signed.  Pre-operative instructions were given.      Sandoval Andrade MD  Obstetrics & Gynecology  [facility]

## 2020-02-26 NOTE — H&P (VIEW-ONLY)
[facility]  Obstetrics & Gynecology  History & Physical    Patient Name: Kimmy Louis  MRN: [id number]  Admission Date: (Not on file)  Primary Care Provider: Soto Caro MD    Subjective:     Chief Complaint/Reason for Admission: surgery    History of Present Illness:   50 yo  with symptomatic fibroids is here for scheduled hysterectomy.  Pt reports no new complaints and is ready for surgery.    Current Outpatient Medications on File Prior to Visit   Medication Sig    glimepiride (AMARYL) 1 MG tablet Take 1 mg by mouth before breakfast.    metformin (GLUCOPHAGE) 1000 MG tablet Take 1,000 mg by mouth 2 (two) times daily with meals.     No current facility-administered medications on file prior to visit.        Review of patient's allergies indicates:  No Known Allergies    Past Medical History:   Diagnosis Date    Diabetes      OB History    Para Term  AB Living   1 1 1     1   SAB TAB Ectopic Multiple Live Births                  # Outcome Date GA Lbr Grant/2nd Weight Sex Delivery Anes PTL Lv   1 Term              Past Surgical History:   Procedure Laterality Date    DILATION AND CURETTAGE OF UTERUS      at [facility] - hysterscopy D&C - polyps removed     Family History     Problem Relation (Age of Onset)    Colon cancer Sister (63)    Diabetes Mother, Father    Hyperlipidemia Father    Hypertension Father    Leukemia Father        Tobacco Use    Smoking status: Former Smoker     Packs/day: 0.50     Years: 10.00     Pack years: 5.00   Substance and Sexual Activity    Alcohol use: Not Currently     Alcohol/week: 0.0 standard drinks     Frequency: Monthly or less    Drug use: No    Sexual activity: Not Currently     Review of Systems   Constitutional: Negative for activity change, appetite change, chills, fatigue, fever and unexpected weight change.   Respiratory: Negative for shortness of breath.    Cardiovascular: Negative for chest pain, palpitations and leg swelling.    Gastrointestinal: Negative for abdominal pain, bloating, blood in stool, constipation, diarrhea, nausea and vomiting.   Genitourinary: Positive for menorrhagia and menstrual problem. Negative for dysmenorrhea, dyspareunia, dysuria, flank pain, frequency, genital sores, hematuria, pelvic pain, urgency, vaginal bleeding, vaginal discharge, vaginal pain, urinary incontinence, postcoital bleeding, vaginal dryness and vaginal odor.   Musculoskeletal: Positive for back pain.   Integumentary:  Negative for breast mass, nipple discharge, breast skin changes and breast tenderness.   Neurological: Negative for syncope and headaches.   Breast: Negative for asymmetry, lump, mass, mastodynia, nipple discharge, skin changes and tenderness    Objective:     Vital Signs (Most Recent):  BP: (!) 110/52 (02/26/20 0954) Vital Signs (24h Range):  [unfilled]     Weight: 91.5 kg (201 lb 11.5 oz)  Body mass index is 31.59 kg/m².  Patient's last menstrual period was 02/17/2020 (exact date).    Physical Exam:   Constitutional: She is oriented to person, place, and time. She appears well-developed and well-nourished. No distress.    HENT:   Head: Normocephalic and atraumatic.    Eyes: Pupils are equal, round, and reactive to light. EOM are normal.    Neck: Normal range of motion.    Cardiovascular: Normal rate, regular rhythm and normal heart sounds.     Pulmonary/Chest: Effort normal and breath sounds normal.        Abdominal: Soft. Bowel sounds are normal. She exhibits no distension. There is no tenderness.             Musculoskeletal: Normal range of motion and moves all extremeties. She exhibits no edema or tenderness.       Neurological: She is alert and oriented to person, place, and time.    Skin: Skin is warm and dry.    Psychiatric: She has a normal mood and affect. Her behavior is normal. Thought content normal.       Laboratory:  I have personallly reviewed all pertinent lab results from the last 24 hours.    Diagnostic  Results:  Labs: Reviewed  US: Reviewed  Pap reviewed    Assessment/Plan:     There are no hospital problems to display for this patient.    Fibroids  -     Procedure risks, benefits, and alternatives were discussed.  Pt voiced understanding and expressed consent for RALH/Bilateral Salpingectomy.  Pt desires ovarian preservation but is OK with ovarian removal if deemed necessary.  Hospital forms were reviewed with pt and signed.  Pre-operative instructions were given.      Sandoval Andrade MD  Obstetrics & Gynecology  [facility]

## 2020-02-27 LAB
FINAL PATHOLOGIC DIAGNOSIS: NORMAL
Lab: NORMAL

## 2020-02-28 ENCOUNTER — PATIENT MESSAGE (OUTPATIENT)
Dept: OBSTETRICS AND GYNECOLOGY | Facility: CLINIC | Age: 50
End: 2020-02-28

## 2020-03-02 ENCOUNTER — HOSPITAL ENCOUNTER (OUTPATIENT)
Facility: HOSPITAL | Age: 50
Discharge: HOME | End: 2020-03-03
Attending: OBSTETRICS & GYNECOLOGY | Admitting: OBSTETRICS & GYNECOLOGY
Payer: COMMERCIAL

## 2020-03-02 ENCOUNTER — ANESTHESIA EVENT (OUTPATIENT)
Dept: SURGERY | Facility: HOSPITAL | Age: 50
End: 2020-03-02
Payer: COMMERCIAL

## 2020-03-02 ENCOUNTER — ANESTHESIA (OUTPATIENT)
Dept: SURGERY | Facility: HOSPITAL | Age: 50
End: 2020-03-02
Payer: COMMERCIAL

## 2020-03-02 DIAGNOSIS — D21.9 FIBROIDS: ICD-10-CM

## 2020-03-02 DIAGNOSIS — Z90.710 STATUS POST LAPAROSCOPIC HYSTERECTOMY: Primary | ICD-10-CM

## 2020-03-02 LAB
B-HCG UR QL: NEGATIVE
CTP QC/QA: YES
POCT GLUCOSE: 235 MG/DL (ref 70–110)
POCT GLUCOSE: 238 MG/DL (ref 70–110)

## 2020-03-02 PROCEDURE — 58573 TLH W/T/O UTERUS OVER 250 G: CPT | Mod: AS,,, | Performed by: PHYSICIAN ASSISTANT

## 2020-03-02 PROCEDURE — 25000003 PHARM REV CODE 250: Performed by: OBSTETRICS & GYNECOLOGY

## 2020-03-02 PROCEDURE — 94799 UNLISTED PULMONARY SVC/PX: CPT

## 2020-03-02 PROCEDURE — 81025 URINE PREGNANCY TEST: CPT | Performed by: OBSTETRICS & GYNECOLOGY

## 2020-03-02 PROCEDURE — C2628 CATHETER, OCCLUSION: HCPCS | Performed by: OBSTETRICS & GYNECOLOGY

## 2020-03-02 PROCEDURE — 36000713 HC OR TIME LEV V EA ADD 15 MIN: Performed by: OBSTETRICS & GYNECOLOGY

## 2020-03-02 PROCEDURE — 25000003 PHARM REV CODE 250: Performed by: NURSE ANESTHETIST, CERTIFIED REGISTERED

## 2020-03-02 PROCEDURE — 58573 PR LAPAROSCOPY TOT HYSTERECTOMY UTERUS >250 GRAM W TUBE/OVARY: ICD-10-PCS | Mod: 22,,, | Performed by: OBSTETRICS & GYNECOLOGY

## 2020-03-02 PROCEDURE — 88307 TISSUE EXAM BY PATHOLOGIST: CPT | Mod: 26,,, | Performed by: PATHOLOGY

## 2020-03-02 PROCEDURE — 37000008 HC ANESTHESIA 1ST 15 MINUTES: Performed by: OBSTETRICS & GYNECOLOGY

## 2020-03-02 PROCEDURE — 58573 PR LAPAROSCOPY TOT HYSTERECTOMY UTERUS >250 GRAM W TUBE/OVARY: ICD-10-PCS | Mod: AS,,, | Performed by: PHYSICIAN ASSISTANT

## 2020-03-02 PROCEDURE — 63600175 PHARM REV CODE 636 W HCPCS: Performed by: NURSE ANESTHETIST, CERTIFIED REGISTERED

## 2020-03-02 PROCEDURE — 63600175 PHARM REV CODE 636 W HCPCS: Performed by: OBSTETRICS & GYNECOLOGY

## 2020-03-02 PROCEDURE — 27201423 OPTIME MED/SURG SUP & DEVICES STERILE SUPPLY: Performed by: OBSTETRICS & GYNECOLOGY

## 2020-03-02 PROCEDURE — 71000039 HC RECOVERY, EACH ADD'L HOUR: Performed by: OBSTETRICS & GYNECOLOGY

## 2020-03-02 PROCEDURE — 37000009 HC ANESTHESIA EA ADD 15 MINS: Performed by: OBSTETRICS & GYNECOLOGY

## 2020-03-02 PROCEDURE — 63600175 PHARM REV CODE 636 W HCPCS: Performed by: ANESTHESIOLOGY

## 2020-03-02 PROCEDURE — 58573 TLH W/T/O UTERUS OVER 250 G: CPT | Mod: 22,,, | Performed by: OBSTETRICS & GYNECOLOGY

## 2020-03-02 PROCEDURE — 71000033 HC RECOVERY, INTIAL HOUR: Performed by: OBSTETRICS & GYNECOLOGY

## 2020-03-02 PROCEDURE — 88307 PR  SURG PATH,LEVEL V: ICD-10-PCS | Mod: 26,,, | Performed by: PATHOLOGY

## 2020-03-02 PROCEDURE — 88307 TISSUE EXAM BY PATHOLOGIST: CPT | Performed by: PATHOLOGY

## 2020-03-02 PROCEDURE — 36000712 HC OR TIME LEV V 1ST 15 MIN: Performed by: OBSTETRICS & GYNECOLOGY

## 2020-03-02 RX ORDER — CEFAZOLIN SODIUM 1 G/3ML
INJECTION, POWDER, FOR SOLUTION INTRAMUSCULAR; INTRAVENOUS
Status: DISCONTINUED | OUTPATIENT
Start: 2020-03-02 | End: 2020-03-02

## 2020-03-02 RX ORDER — DIPHENHYDRAMINE HCL 25 MG
25 CAPSULE ORAL EVERY 4 HOURS PRN
Status: DISCONTINUED | OUTPATIENT
Start: 2020-03-02 | End: 2020-03-03 | Stop reason: HOSPADM

## 2020-03-02 RX ORDER — FENTANYL CITRATE 50 UG/ML
INJECTION, SOLUTION INTRAMUSCULAR; INTRAVENOUS
Status: DISCONTINUED | OUTPATIENT
Start: 2020-03-02 | End: 2020-03-02

## 2020-03-02 RX ORDER — MIDAZOLAM HYDROCHLORIDE 1 MG/ML
INJECTION, SOLUTION INTRAMUSCULAR; INTRAVENOUS
Status: DISCONTINUED | OUTPATIENT
Start: 2020-03-02 | End: 2020-03-02

## 2020-03-02 RX ORDER — KETOROLAC TROMETHAMINE 30 MG/ML
15 INJECTION, SOLUTION INTRAMUSCULAR; INTRAVENOUS EVERY 8 HOURS PRN
Status: DISCONTINUED | OUTPATIENT
Start: 2020-03-02 | End: 2020-03-02 | Stop reason: HOSPADM

## 2020-03-02 RX ORDER — SODIUM CHLORIDE, SODIUM LACTATE, POTASSIUM CHLORIDE, CALCIUM CHLORIDE 600; 310; 30; 20 MG/100ML; MG/100ML; MG/100ML; MG/100ML
INJECTION, SOLUTION INTRAVENOUS CONTINUOUS
Status: DISCONTINUED | OUTPATIENT
Start: 2020-03-02 | End: 2020-03-03 | Stop reason: HOSPADM

## 2020-03-02 RX ORDER — HYDROMORPHONE HYDROCHLORIDE 1 MG/ML
1 INJECTION, SOLUTION INTRAMUSCULAR; INTRAVENOUS; SUBCUTANEOUS EVERY 6 HOURS PRN
Status: DISCONTINUED | OUTPATIENT
Start: 2020-03-02 | End: 2020-03-03 | Stop reason: HOSPADM

## 2020-03-02 RX ORDER — KETOROLAC TROMETHAMINE 30 MG/ML
30 INJECTION, SOLUTION INTRAMUSCULAR; INTRAVENOUS EVERY 6 HOURS
Status: DISCONTINUED | OUTPATIENT
Start: 2020-03-02 | End: 2020-03-03 | Stop reason: HOSPADM

## 2020-03-02 RX ORDER — ONDANSETRON 2 MG/ML
4 INJECTION INTRAMUSCULAR; INTRAVENOUS DAILY PRN
Status: DISCONTINUED | OUTPATIENT
Start: 2020-03-02 | End: 2020-03-02 | Stop reason: HOSPADM

## 2020-03-02 RX ORDER — ONDANSETRON 8 MG/1
8 TABLET, ORALLY DISINTEGRATING ORAL EVERY 8 HOURS PRN
Status: DISCONTINUED | OUTPATIENT
Start: 2020-03-02 | End: 2020-03-03 | Stop reason: HOSPADM

## 2020-03-02 RX ORDER — NEOSTIGMINE METHYLSULFATE 1 MG/ML
INJECTION, SOLUTION INTRAVENOUS
Status: DISCONTINUED | OUTPATIENT
Start: 2020-03-02 | End: 2020-03-02

## 2020-03-02 RX ORDER — GLYCOPYRROLATE 0.2 MG/ML
INJECTION INTRAMUSCULAR; INTRAVENOUS
Status: DISCONTINUED | OUTPATIENT
Start: 2020-03-02 | End: 2020-03-02

## 2020-03-02 RX ORDER — VECURONIUM BROMIDE FOR INJECTION 1 MG/ML
INJECTION, POWDER, LYOPHILIZED, FOR SOLUTION INTRAVENOUS
Status: DISCONTINUED | OUTPATIENT
Start: 2020-03-02 | End: 2020-03-02

## 2020-03-02 RX ORDER — LIDOCAINE HCL/PF 100 MG/5ML
SYRINGE (ML) INTRAVENOUS
Status: DISCONTINUED | OUTPATIENT
Start: 2020-03-02 | End: 2020-03-02

## 2020-03-02 RX ORDER — ONDANSETRON 2 MG/ML
INJECTION INTRAMUSCULAR; INTRAVENOUS
Status: DISCONTINUED | OUTPATIENT
Start: 2020-03-02 | End: 2020-03-02

## 2020-03-02 RX ORDER — HYDROCODONE BITARTRATE AND ACETAMINOPHEN 5; 325 MG/1; MG/1
1 TABLET ORAL EVERY 4 HOURS PRN
Status: DISCONTINUED | OUTPATIENT
Start: 2020-03-02 | End: 2020-03-03 | Stop reason: HOSPADM

## 2020-03-02 RX ORDER — PROPOFOL 10 MG/ML
VIAL (ML) INTRAVENOUS
Status: DISCONTINUED | OUTPATIENT
Start: 2020-03-02 | End: 2020-03-02

## 2020-03-02 RX ORDER — CEFAZOLIN SODIUM 2 G/50ML
2 SOLUTION INTRAVENOUS
Status: DISCONTINUED | OUTPATIENT
Start: 2020-03-02 | End: 2020-03-02 | Stop reason: HOSPADM

## 2020-03-02 RX ORDER — OXYCODONE AND ACETAMINOPHEN 5; 325 MG/1; MG/1
1 TABLET ORAL
Status: DISCONTINUED | OUTPATIENT
Start: 2020-03-02 | End: 2020-03-02 | Stop reason: HOSPADM

## 2020-03-02 RX ORDER — SODIUM CHLORIDE, SODIUM LACTATE, POTASSIUM CHLORIDE, CALCIUM CHLORIDE 600; 310; 30; 20 MG/100ML; MG/100ML; MG/100ML; MG/100ML
INJECTION, SOLUTION INTRAVENOUS CONTINUOUS PRN
Status: DISCONTINUED | OUTPATIENT
Start: 2020-03-02 | End: 2020-03-02

## 2020-03-02 RX ORDER — HYDROCODONE BITARTRATE AND ACETAMINOPHEN 10; 325 MG/1; MG/1
1 TABLET ORAL EVERY 4 HOURS PRN
Status: DISCONTINUED | OUTPATIENT
Start: 2020-03-02 | End: 2020-03-03 | Stop reason: HOSPADM

## 2020-03-02 RX ORDER — SIMETHICONE 80 MG
80 TABLET,CHEWABLE ORAL EVERY 4 HOURS PRN
Status: DISCONTINUED | OUTPATIENT
Start: 2020-03-02 | End: 2020-03-03 | Stop reason: HOSPADM

## 2020-03-02 RX ORDER — KETOROLAC TROMETHAMINE 30 MG/ML
INJECTION, SOLUTION INTRAMUSCULAR; INTRAVENOUS
Status: DISCONTINUED | OUTPATIENT
Start: 2020-03-02 | End: 2020-03-02

## 2020-03-02 RX ORDER — SODIUM CHLORIDE 9 MG/ML
INJECTION, SOLUTION INTRAVENOUS CONTINUOUS
Status: DISCONTINUED | OUTPATIENT
Start: 2020-03-02 | End: 2020-03-02

## 2020-03-02 RX ORDER — HYDROMORPHONE HYDROCHLORIDE 2 MG/ML
0.2 INJECTION, SOLUTION INTRAMUSCULAR; INTRAVENOUS; SUBCUTANEOUS EVERY 5 MIN PRN
Status: COMPLETED | OUTPATIENT
Start: 2020-03-02 | End: 2020-03-02

## 2020-03-02 RX ADMIN — SODIUM CHLORIDE, SODIUM LACTATE, POTASSIUM CHLORIDE, AND CALCIUM CHLORIDE: 600; 310; 30; 20 INJECTION, SOLUTION INTRAVENOUS at 11:03

## 2020-03-02 RX ADMIN — SODIUM CHLORIDE, SODIUM LACTATE, POTASSIUM CHLORIDE, AND CALCIUM CHLORIDE: .6; .31; .03; .02 INJECTION, SOLUTION INTRAVENOUS at 03:03

## 2020-03-02 RX ADMIN — MIDAZOLAM 2 MG: 1 INJECTION INTRAMUSCULAR; INTRAVENOUS at 10:03

## 2020-03-02 RX ADMIN — FENTANYL CITRATE 250 MCG: 50 INJECTION, SOLUTION INTRAMUSCULAR; INTRAVENOUS at 10:03

## 2020-03-02 RX ADMIN — HYDROCODONE BITARTRATE AND ACETAMINOPHEN 1 TABLET: 10; 325 TABLET ORAL at 09:03

## 2020-03-02 RX ADMIN — HYDROMORPHONE HYDROCHLORIDE 0.2 MG: 2 INJECTION INTRAMUSCULAR; INTRAVENOUS; SUBCUTANEOUS at 01:03

## 2020-03-02 RX ADMIN — ONDANSETRON 8 MG: 8 TABLET, ORALLY DISINTEGRATING ORAL at 07:03

## 2020-03-02 RX ADMIN — ROBINUL 0.4 MG: 0.2 INJECTION INTRAMUSCULAR; INTRAVENOUS at 12:03

## 2020-03-02 RX ADMIN — HYDROMORPHONE HYDROCHLORIDE 0.2 MG: 2 INJECTION INTRAMUSCULAR; INTRAVENOUS; SUBCUTANEOUS at 02:03

## 2020-03-02 RX ADMIN — GUAIFENESIN AND DEXTROMETHORPHAN HYDROBROMIDE 1 TABLET: 600; 30 TABLET, EXTENDED RELEASE ORAL at 09:03

## 2020-03-02 RX ADMIN — FENTANYL CITRATE 50 MCG: 50 INJECTION, SOLUTION INTRAMUSCULAR; INTRAVENOUS at 01:03

## 2020-03-02 RX ADMIN — HYDROCODONE BITARTRATE AND ACETAMINOPHEN 1 TABLET: 10; 325 TABLET ORAL at 05:03

## 2020-03-02 RX ADMIN — VECURONIUM BROMIDE 10 MG: 10 INJECTION, POWDER, LYOPHILIZED, FOR SOLUTION INTRAVENOUS at 10:03

## 2020-03-02 RX ADMIN — LIDOCAINE HYDROCHLORIDE 100 MG: 20 INJECTION, SOLUTION INTRAVENOUS at 10:03

## 2020-03-02 RX ADMIN — PROPOFOL 100 MG: 10 INJECTION, EMULSION INTRAVENOUS at 10:03

## 2020-03-02 RX ADMIN — ONDANSETRON 4 MG: 2 INJECTION, SOLUTION INTRAMUSCULAR; INTRAVENOUS at 12:03

## 2020-03-02 RX ADMIN — KETOROLAC TROMETHAMINE 30 MG: 30 INJECTION, SOLUTION INTRAMUSCULAR; INTRAVENOUS at 05:03

## 2020-03-02 RX ADMIN — SODIUM CHLORIDE, SODIUM LACTATE, POTASSIUM CHLORIDE, AND CALCIUM CHLORIDE: 600; 310; 30; 20 INJECTION, SOLUTION INTRAVENOUS at 10:03

## 2020-03-02 RX ADMIN — NEOSTIGMINE METHYLSULFATE 3 MG: 1 INJECTION INTRAVENOUS at 12:03

## 2020-03-02 RX ADMIN — KETOROLAC TROMETHAMINE 30 MG: 30 INJECTION, SOLUTION INTRAMUSCULAR; INTRAVENOUS at 12:03

## 2020-03-02 RX ADMIN — CEFAZOLIN 2 G: 1 INJECTION, POWDER, FOR SOLUTION INTRAMUSCULAR; INTRAVENOUS at 10:03

## 2020-03-02 NOTE — INTERVAL H&P NOTE
The patient has been examined and the H&P has been reviewed:    I concur with the findings and no changes have occurred since H&P was written.    Anesthesia/Surgery risks, benefits and alternative options discussed and understood by patient/family.          Active Hospital Problems    Diagnosis  POA    Fibroids [D21.9]  Yes      Resolved Hospital Problems   No resolved problems to display.

## 2020-03-02 NOTE — HOSPITAL COURSE
Pt was admitted for scheduled hysterectomy.  RALH/Bilateral Salpingectomy/Mini-laparotomy for specimen removal was performed without complications.  Post-operative course was unremarkable and pt recovered well.

## 2020-03-02 NOTE — HPI
48 yo  with symptomatic fibroids is here for scheduled hysterectomy.  Pt reports no new complaints and is ready for surgery.

## 2020-03-02 NOTE — PLAN OF CARE
Patient arrived from surgery this afternoon. Lap sites x 4 to abdomen with dermabond and lower abdominal incision with aquacel covering. Friend at bedside. Friend of patient states that she has no family that she speaks with. Due to void. Patient resting in bed. SCDs BLE. Chart check completed.

## 2020-03-02 NOTE — ANESTHESIA PREPROCEDURE EVALUATION
03/02/2020  Kimmy Louis is a 49 y.o., female.    Anesthesia Evaluation    I have reviewed the Patient Summary Reports.    I have reviewed the Nursing Notes.   I have reviewed the Medications.     Review of Systems  Anesthesia Hx:  No problems with previous Anesthesia    Social:  Non-Smoker, Former Smoker    Hematology/Oncology:  Hematology Normal        Cardiovascular:  Cardiovascular Normal     Pulmonary:  Pulmonary Normal    Renal/:  Renal/ Normal     Hepatic/GI:  Hepatic/GI Normal    Neurological:  Neurology Normal    Endocrine:   Diabetes        Physical Exam  General:  Well nourished    Airway/Jaw/Neck:  Airway Findings: Mouth Opening: Normal Tongue: Normal  General Airway Assessment: Adult  Mallampati: II  TM Distance: 4 - 6 cm  Jaw/Neck Findings:  Neck ROM: Normal ROM      Dental:  Dental Findings: In tact   Chest/Lungs:  Chest/Lungs Findings: Clear to auscultation, Normal Respiratory Rate     Heart/Vascular:  Heart Findings: Rate: Normal  Rhythm: Regular Rhythm  Sounds: Normal        Mental Status:  Mental Status Findings:  Cooperative, Alert and Oriented         Anesthesia Plan  Type of Anesthesia, risks & benefits discussed:  Anesthesia Type:  general  Patient's Preference:   Intra-op Monitoring Plan: standard ASA monitors  Intra-op Monitoring Plan Comments:   Post Op Pain Control Plan: multimodal analgesia and per primary service following discharge from PACU  Post Op Pain Control Plan Comments:   Induction:   IV  Beta Blocker:         Informed Consent: Patient understands risks and agrees with Anesthesia plan.  Questions answered. Anesthesia consent signed with patient.  ASA Score: 2     Day of Surgery Review of History & Physical:  There are no significant changes.          Ready For Surgery From Anesthesia Perspective.     Patient Active Problem List   Diagnosis    Nausea & vomiting     Fibroids       Chemistry        Component Value Date/Time     02/26/2020 1208    K 4.0 02/26/2020 1208     02/26/2020 1208    CO2 24 02/26/2020 1208    BUN 16 02/26/2020 1208    CREATININE 0.8 02/26/2020 1208     (H) 02/26/2020 1208        Component Value Date/Time    CALCIUM 9.0 02/26/2020 1208    ESTGFRAFRICA >60.0 02/26/2020 1208    EGFRNONAA >60.0 02/26/2020 1208        Lab Results   Component Value Date    WBC 5.34 02/26/2020    HGB 12.1 02/26/2020    HCT 40.3 02/26/2020    MCV 91 02/26/2020     02/26/2020

## 2020-03-02 NOTE — TRANSFER OF CARE
"Anesthesia Transfer of Care Note    Patient: Kimmy Louis    Procedure(s) Performed: Procedure(s) (LRB):  XI ROBOTIC HYSTERECTOMY (N/A)  XI ROBOTIC SALPINGECTOMY (Bilateral)  LAPAROTOMY (N/A)    Patient location: PACU    Anesthesia Type: general    Transport from OR: Transported from OR on room air with adequate spontaneous ventilation    Post pain: adequate analgesia    Post assessment: no apparent anesthetic complications    Post vital signs: stable    Level of consciousness: responds to stimulation    Nausea/Vomiting: no nausea/vomiting    Complications: none    Transfer of care protocol was followed      Last vitals:   Visit Vitals  BP (!) 161/67 (BP Location: Right arm, Patient Position: Sitting)   Pulse 62   Temp 36.7 °C (98.1 °F) (Temporal)   Resp 20   Ht 5' 7" (1.702 m)   Wt 89.1 kg (196 lb 6.9 oz)   LMP 02/17/2020 (Exact Date)   SpO2 99%   Breastfeeding? No   BMI 30.77 kg/m²     "

## 2020-03-02 NOTE — ANESTHESIA POSTPROCEDURE EVALUATION
Anesthesia Post Evaluation    Patient: Kimmy Louis    Procedure(s) Performed: Procedure(s) (LRB):  XI ROBOTIC HYSTERECTOMY (N/A)  XI ROBOTIC SALPINGECTOMY (Bilateral)  LAPAROTOMY (N/A)    Final Anesthesia Type: general    Patient location during evaluation: PACU  Patient participation: Yes- Able to Participate  Level of consciousness: awake and alert  Post-procedure vital signs: reviewed and stable  Pain management: adequate  Airway patency: patent  IZZY mitigation strategies: Extubation while patient is awake  PONV status at discharge: No PONV  Anesthetic complications: no      Cardiovascular status: hemodynamically stable  Respiratory status: spontaneous ventilation  Hydration status: euvolemic  Follow-up not needed.          Vitals Value Taken Time   /69 3/2/2020  2:41 PM   Temp 37.1 °C (98.7 °F) 3/2/2020  2:41 PM   Pulse 72 3/2/2020  2:41 PM   Resp 16 3/2/2020  2:41 PM   SpO2 97 % 3/2/2020  2:52 PM         Event Time     Out of Recovery 14:35:00          Pain/More Score: Pain Rating Prior to Med Admin: 6 (3/2/2020  2:25 PM)  Pain Rating Post Med Admin: 6 (3/2/2020  2:45 PM)  More Score: 8 (3/2/2020  2:15 PM)

## 2020-03-02 NOTE — OP NOTE
OPERATIVE NOTE    DATE:  03/02/2020    PRE-PROCEDURE COUNSELING:  Patient counseled on the risks, benefits, and alternatives to procedure.  Please see preoperative consents.   SCDs were applied and working prior to anesthesia induction.                                                                                    PREOPERATIVE DIAGNOSES: Symptomatic uterine fibroids                                                                               POSTOPERATIVE DIAGNOSES:  Same                                                                                ANESTHESIA:  General Endotracheal Anesthesia    PROCEDURE:    1.  Robot-Assisted Laparoscopic Hysterectomy  2.  Bilateral Salpingectomy   3.  Mini-Laparotomy for specimen removal    SURGEON:  Sandoval Andrade M.D.                                                                                               ASSISTANT:  BONG Cheney (presence necessary for procedure)                                                                                                        FINDINGS: 16 week uterus with numerous fibroids; normal tubes and ovaries bilaterally    SPECIMEN: Uterus, Cervix, Tubes    EBL:  350 mL    COMPLICATIONS:  None    IMPLANTS:  None                                                                               PROCEDURE IN DETAIL:    After discussion of the risks, benefits and alternatives, the patient understood the potential risks and complications and signed consents were reviewed. Patient was given preoperative sequential compression devices and antibiotics and was taken to the Operating Room where the general endotracheal anesthesia was administered and found to be adequate.  She was prepped and draped in routine fashion. The KIRSTEN manipulator was applied in routine fashion.  Attention was turned to the abdomen where the anterior abdominal wall was grasped with towel clamps and elevated.  Veress needle was introduced through the umbilicus and  abdomen was insufflated with CO2 gas to 15 mmHg.  A 8 mm incision was made in the LUQ and a 8 mm trocar was placed into the abdomen. Under direct visualization, ancillary ports were placed.  This included a total of four 8 mm lateral ports (two in the LUQ and two in the RUQ).  The patient was noted to be in correct anatomical position, placed in Trendelenburg, and robotic operating system was advanced towards the patient. Robotic arms were attached to trocar ends and the operating instruments were placed into the abdomen under direct visualization from the console.This included Bipolar [place] for cautery and EndoShears for cold-cut transection.  A survey of the pelvis was made. All pedicles were surgically transected in a similar fashion: with bipolar cautery followed by a cold cut transection.      The path of the ureter was visualized. Next, the utero-ovarian ligament, salpinx, mesosalpinx, round ligament were cauterized and transected followed by successive pedicles of the posterior broad ligament, round ligament and carried through the anterior broad ligament to create a bladder flap. Same was performed on the other side and the uterine arteries were skeletonized on both sides. Anterior followed by posterior colpotomies were made.  Next, the left uterine artery was then cauterized and transected followed by successive pedicles of the cardinal ligament to reach the colpotomy site. The same was performed on the other side. Attempts to removed the uterus and cervix through the vagina were not successful due to large uterine size.  Morcellation was not possible due to poor descent/visualization in the vagina.  As a result, decision was made to remove the specimen through a mini-laparotomy incsion.  The specimen was then placed out of the pelvis.  The vaginal incision was then approximated with 0 Vicryl in a running locked fashion.  Lapra-Tys were applied at the ends of the incision, and an intracorporal knot was  tied in the center of the incision.  Irrigation, desufflation and reinsufflation confirmed hemostasis.  All instruments were removed and the abdomen deflated.  A transverse mini-laparotomy incision was then made with scalpel 2 cm above the pubic symphysis.  This incision was carried down to the fascia which was subsequently incised in the midline.  This incision was extended laterally.  Rectus muscles were  at the midline and peritoneum identified.  This was elevated and entered sharply.  This was extended with manual traction.  The specimen was readily identified and grasped.  The specimen was then removed through the incision and sent to pathology for analysis.  Pelvis was then irrigated and hemostasis again confirmed.  All instruments were removed and the rectus muscles loosely approximated at the midline with 2-0 Chromic.  Fascia was approximated with 0-Vicryl in a running fashion.  Wound was irrigated and hemostasis confirmed.  All skin wounds were closed with 4-0 Moncryl and supported with Dermabond.  Lap, needle and instrument counts were correct x 2.       Patient was sent to the recovery room in stable condition.

## 2020-03-03 VITALS
HEART RATE: 66 BPM | HEIGHT: 67 IN | BODY MASS INDEX: 30.83 KG/M2 | WEIGHT: 196.44 LBS | RESPIRATION RATE: 18 BRPM | OXYGEN SATURATION: 99 % | DIASTOLIC BLOOD PRESSURE: 59 MMHG | SYSTOLIC BLOOD PRESSURE: 127 MMHG | TEMPERATURE: 99 F

## 2020-03-03 PROCEDURE — 63600175 PHARM REV CODE 636 W HCPCS: Performed by: OBSTETRICS & GYNECOLOGY

## 2020-03-03 PROCEDURE — 94799 UNLISTED PULMONARY SVC/PX: CPT

## 2020-03-03 PROCEDURE — 25000003 PHARM REV CODE 250: Performed by: OBSTETRICS & GYNECOLOGY

## 2020-03-03 RX ORDER — DOCUSATE SODIUM 100 MG/1
100 CAPSULE, LIQUID FILLED ORAL 2 TIMES DAILY
Qty: 30 CAPSULE | Refills: 0 | Status: SHIPPED | OUTPATIENT
Start: 2020-03-03

## 2020-03-03 RX ORDER — IBUPROFEN 800 MG/1
800 TABLET ORAL 3 TIMES DAILY
Qty: 30 TABLET | Refills: 0 | Status: SHIPPED | OUTPATIENT
Start: 2020-03-03 | End: 2021-03-03

## 2020-03-03 RX ORDER — HYDROCODONE BITARTRATE AND ACETAMINOPHEN 5; 325 MG/1; MG/1
1 TABLET ORAL EVERY 4 HOURS PRN
Qty: 20 TABLET | Refills: 0 | OUTPATIENT
Start: 2020-03-03 | End: 2022-12-10

## 2020-03-03 RX ADMIN — GUAIFENESIN AND DEXTROMETHORPHAN HYDROBROMIDE 1 TABLET: 600; 30 TABLET, EXTENDED RELEASE ORAL at 09:03

## 2020-03-03 RX ADMIN — KETOROLAC TROMETHAMINE 30 MG: 30 INJECTION, SOLUTION INTRAMUSCULAR; INTRAVENOUS at 05:03

## 2020-03-03 NOTE — DISCHARGE SUMMARY
[facility] -   Obstetrics & Gynecology  Discharge Summary    Patient Name: Kimmy Louis  MRN: [id number]  Admission Date: 3/2/2020  Hospital Length of Stay: 0 days  Discharge Date and Time:  2020 11:55 AM  Attending Physician: Sandoval Andrade MD   Discharging Provider: Brianne Amaya PA-C  Primary Care Provider: Soto Caro MD    HPI:  50 yo  with symptomatic fibroids is here for scheduled hysterectomy.  Pt reports no new complaints and is ready for surgery.    Hospital Course:  Pt was admitted for scheduled hysterectomy.  RALH/Bilateral Salpingectomy/Mini-laparotomy for specimen removal was performed without complications.  Post-operative course was unremarkable and pt recovered well.      Procedure(s) (LRB):  XI ROBOTIC HYSTERECTOMY (N/A)  XI ROBOTIC SALPINGECTOMY (Bilateral)  LAPAROTOMY (N/A)         Significant Diagnostic Studies: Labs: All labs within the past 24 hours have been reviewed    Pending Diagnostic Studies:     Procedure Component Value Units Date/Time    Specimen to Pathology, Surgery Gynecology and Obstetrics [[id number]] Collected:  20 1301    Order Status:  Sent Lab Status:  In process Updated:  20 1427        Final Active Diagnoses:    Diagnosis Date Noted POA    PRINCIPAL PROBLEM:  Status post laparoscopic hysterectomy [Z90.710] 2020 No    Fibroids [D21.9] 2020 Yes      Problems Resolved During this Admission:        Discharged Condition: good    Disposition: Home or Self Care    Follow Up:  Follow-up Information     Sandoval Andrade MD In 4 weeks.    Specialty:  Obstetrics and Gynecology  Why:  Post-operative visit  Contact information:  [address]  [place] [address] [phone]             [facility] In 1 week.    Why:  For dressing removal               Patient Instructions:      Diet Adult Regular     Lifting restrictions   Order Comments: Limit lifting to 15 lb     Other restrictions (specify):   Order Comments: Pelvic  rest: nothing in the vagina, including douching, tampons, or intercourse for 12 weeks  Shower only, no baths until cleared by surgeon     No driving until:   Order Comments: No driving until off of all opiate pain meds and able to brake quickly without pain.     Notify your health care provider if you experience any of the following:  persistent nausea and vomiting or diarrhea     Notify your health care provider if you experience any of the following:  severe uncontrolled pain     Notify your health care provider if you experience any of the following:  redness, tenderness, or signs of infection (pain, swelling, redness, odor or green/yellow discharge around incision site)     Notify your health care provider if you experience any of the following:  difficulty breathing or increased cough     Notify your health care provider if you experience any of the following:  increased confusion or weakness     Notify your health care provider if you experience any of the following:   Order Comments: Heavy vaginal bleeding.     Medications:  Reconciled Home Medications:      Medication List      START taking these medications    docusate sodium 100 MG capsule  Commonly known as:  COLACE  Take 1 capsule (100 mg total) by mouth 2 (two) times daily.     HYDROcodone-acetaminophen 5-325 mg per tablet  Commonly known as:  NORCO  Take 1 tablet by mouth every 4 (four) hours as needed for Pain.     ibuprofen 800 MG tablet  Commonly known as:  ADVIL,MOTRIN  Take 1 tablet (800 mg total) by mouth 3 (three) times daily.        CONTINUE taking these medications    glimepiride 1 MG tablet  Commonly known as:  AMARYL  Take 1 mg by mouth before breakfast.     metFORMIN 1000 MG tablet  Commonly known as:  GLUCOPHAGE  Take 1,000 mg by mouth 2 (two) times daily with meals.            Brianne Amaya PA-C  Obstetrics & Gynecology  [facility]

## 2020-03-03 NOTE — PROGRESS NOTES
Subjective:       Patient ID: Kimmy Louis is a 49 y.o. female.    Chief Complaint:  Post-op Evaluation      History of Present Illness  HPI   Postoperative Follow-up  Patient presents to the clinic 1 weeks status post Davinci assisted hysterectomy, bilateral salpingectomy and mini-laparotomy for specimen removal for fibroids. Eating a regular diet without difficulty. Bowel movements are normal. Pain is controlled with current analgesics. Medications being used: ibuprofen (OTC) and Norco.  Denies vaginal bleeding    GYN & OB History  Patient's last menstrual period was 2020 (exact date).   Date of Last Pap: No result found    OB History    Para Term  AB Living   1 1 1     1   SAB TAB Ectopic Multiple Live Births                  # Outcome Date GA Lbr Grant/2nd Weight Sex Delivery Anes PTL Lv   1 Term                Review of Systems  Review of Systems   Constitutional: Negative for activity change, chills, fatigue and fever.   Respiratory: Negative for cough.    Cardiovascular: Negative for chest pain and leg swelling.   Gastrointestinal: Negative for abdominal pain, constipation, nausea and vomiting.   Genitourinary: Negative for dysuria, frequency, hematuria, pelvic pain, urgency, vaginal bleeding and vaginal discharge.   Integumentary:  Negative for rash.           Objective:    Physical Exam:   Constitutional: She is oriented to person, place, and time. She appears well-developed and well-nourished. No distress.       Cardiovascular: Normal rate.     Pulmonary/Chest: Effort normal. No respiratory distress.        Abdominal: Soft. She exhibits abdominal incision (4 trocar incision intact with dermabond in place; Aquacel dressing in place, clear/dry/intact, dressing removed with incision intact and healing well, no erythema/induration/drainage). She exhibits no distension. There is no tenderness. There is no guarding.             Musculoskeletal: Moves all extremeties. She exhibits no  edema.   No calf tenderness       Neurological: She is alert and oriented to person, place, and time.    Skin: Skin is warm and dry. No rash noted. She is not diaphoretic.           Problem List Items Addressed This Visit        Renal/    Status post laparoscopic hysterectomy - Primary        Patient is scheduled for postop visit on 4/3 with Dr Andrade.    Reviewed all restrictions & limitations with the patient. Advised to call clinic in event of fever, redness or drainage around the incision, worsening pain, or any any concerning issues or symptoms.  Instructed the importance of showering daily, no tub baths, using an antibacterial soap.  Patient verbalized understanding.

## 2020-03-03 NOTE — SUBJECTIVE & OBJECTIVE
Interval History: Patient reports she is doing well. She is ambulating well. Voiding well. No nausea or vomiting, no flatus as yet. No fever overnight. Pain is well controlled. No vaginal bleeding.      Scheduled Meds:   dextromethorphan-guaifenesin  mg  1 tablet Oral BID    ketorolac  30 mg Intravenous Q6H    nozaseptin   Each Nostril BID     Continuous Infusions:   lactated ringers 125 mL/hr at 03/02/20 1502     PRN Meds:diphenhydrAMINE, HYDROcodone-acetaminophen, HYDROcodone-acetaminophen, HYDROmorphone, ondansetron, promethazine (PHENERGAN) IVPB, simethicone    Review of patient's allergies indicates:  No Known Allergies    Objective:     Vital Signs (Most Recent):  Temp: 98.7 °F (37.1 °C) (03/03/20 0716)  Pulse: 60 (03/03/20 0716)  Resp: 18 (03/03/20 0716)  BP: (!) 112/53 (03/03/20 0716)  SpO2: 95 % (03/03/20 0716) Vital Signs (24h Range):  Temp:  [98 °F (36.7 °C)-98.8 °F (37.1 °C)] 98.7 °F (37.1 °C)  Pulse:  [59-85] 60  Resp:  [12-20] 18  SpO2:  [95 %-100 %] 95 %  BP: (112-163)/(53-70) 112/53     Weight: 89.1 kg (196 lb 6.9 oz)  Body mass index is 30.77 kg/m².  Patient's last menstrual period was 02/17/2020 (exact date).    I&O (Last 24H):    Intake/Output Summary (Last 24 hours) at 3/3/2020 0842  Last data filed at 3/3/2020 0800  Gross per 24 hour   Intake 4758.33 ml   Output 450 ml   Net 4308.33 ml       Physical Exam:   Constitutional: She is oriented to person, place, and time. She appears well-developed and well-nourished. No distress.       Cardiovascular: Normal rate, regular rhythm, normal heart sounds and intact distal pulses.    No murmur heard.   Pulmonary/Chest: Effort normal and breath sounds normal. No respiratory distress. She has no wheezes. She has no rales.        Abdominal: Soft. Bowel sounds are normal. She exhibits abdominal incision (4 trocar incision intact with dermabond; Aquacel dressing in place, clear/dry/intact). She exhibits no distension. There is no tenderness. There is  no guarding.             Musculoskeletal: Moves all extremeties. She exhibits no edema.   No calf tenderness       Neurological: She is alert and oriented to person, place, and time.    Skin: Skin is warm and dry. No rash noted. She is not diaphoretic.        Laboratory:  I have personallly reviewed all pertinent lab results from the last 24 hours.    Diagnostic Results:  Labs: Reviewed

## 2020-03-03 NOTE — ASSESSMENT & PLAN NOTE
POD #1 s/p RALH, bilateral salpingectomy, mini-laparotomy for specimen removal  Pt doing well, afebrile overnight.  Proceed with routine postoperative care, encouraged ambulation, aware ok to shower.  Pt counseled on management plan and discharge goals. Anticipate discharge later today.

## 2020-03-03 NOTE — PLAN OF CARE
Pt complains of incisional pain. Pain medication is effective. Dressing is dry and intact. Iv fluids are infusing. No injuiries. Will continue to monitor. 12 hour chart check is completed.

## 2020-03-03 NOTE — PROGRESS NOTES
[facility] -   Obstetrics & Gynecology  Progress Note    Patient Name: Kimmy Louis  MRN: [id number]  Admission Date: 3/2/2020  Primary Care Provider: Soto Caro MD  Principal Problem: Status post laparoscopic hysterectomy    Subjective:     HPI:  48 yo  with symptomatic fibroids is here for scheduled hysterectomy.  Pt reports no new complaints and is ready for surgery.    Interval History: Patient reports she is doing well. She is ambulating well. Voiding well. No nausea or vomiting, no flatus as yet. No fever overnight. Pain is well controlled. No vaginal bleeding.      Scheduled Meds:   dextromethorphan-guaifenesin  mg  1 tablet Oral BID    ketorolac  30 mg Intravenous Q6H    nozaseptin   Each Nostril BID     Continuous Infusions:   lactated ringers 125 mL/hr at 20 1502     PRN Meds:diphenhydrAMINE, HYDROcodone-acetaminophen, HYDROcodone-acetaminophen, HYDROmorphone, ondansetron, promethazine (PHENERGAN) IVPB, simethicone    Review of patient's allergies indicates:  No Known Allergies    Objective:     Vital Signs (Most Recent):  Temp: 98.7 °F (37.1 °C) (20)  Pulse: 60 (20)  Resp: 18 (20)  BP: (!) 112/53 (20)  SpO2: 95 % (20) Vital Signs (24h Range):  Temp:  [98 °F (36.7 °C)-98.8 °F (37.1 °C)] 98.7 °F (37.1 °C)  Pulse:  [59-85] 60  Resp:  [12-20] 18  SpO2:  [95 %-100 %] 95 %  BP: (112-163)/(53-70) 112/53     Weight: 89.1 kg (196 lb 6.9 oz)  Body mass index is 30.77 kg/m².  Patient's last menstrual period was 2020 (exact date).    I&O (Last 24H):    Intake/Output Summary (Last 24 hours) at 3/3/2020 0842  Last data filed at 3/3/2020 0800  Gross per 24 hour   Intake 4758.33 ml   Output 450 ml   Net 4308.33 ml       Physical Exam:   Constitutional: She is oriented to person, place, and time. She appears well-developed and well-nourished. No distress.       Cardiovascular: Normal rate, regular rhythm, normal heart sounds  and intact distal pulses.    No murmur heard.   Pulmonary/Chest: Effort normal and breath sounds normal. No respiratory distress. She has no wheezes. She has no rales.        Abdominal: Soft. Bowel sounds are normal. She exhibits abdominal incision (4 trocar incision intact with dermabond; Aquacel dressing in place, clear/dry/intact). She exhibits no distension. There is no tenderness. There is no guarding.             Musculoskeletal: Moves all extremeties. She exhibits no edema.   No calf tenderness       Neurological: She is alert and oriented to person, place, and time.    Skin: Skin is warm and dry. No rash noted. She is not diaphoretic.        Laboratory:  I have personallly reviewed all pertinent lab results from the last 24 hours.    Diagnostic Results:  Labs: Reviewed    Assessment/Plan:     * Status post laparoscopic hysterectomy  POD #1 s/p KELLIE, bilateral salpingectomy, mini-laparotomy for specimen removal  Pt doing well, afebrile overnight.  Proceed with routine postoperative care, encouraged ambulation, aware ok to shower.  Pt counseled on management plan and discharge goals. Anticipate discharge later today.           Fibroids  S/p RALH        Brianne Amaya PA-C  Obstetrics & Gynecology  [facility]

## 2020-03-03 NOTE — PLAN OF CARE
Patient remains injury free.  No signs or symptoms of distress noted.  Patient denies any pain or discomfort at this time and will be discharged to home to self care.

## 2020-03-03 NOTE — NURSING
Patient discharged, education and instructions given. Post op incision care explained. IV discontinued.  Sailaja Pires RN

## 2020-03-05 LAB
FINAL PATHOLOGIC DIAGNOSIS: NORMAL
GROSS: NORMAL

## 2020-03-09 ENCOUNTER — OFFICE VISIT (OUTPATIENT)
Dept: OBSTETRICS AND GYNECOLOGY | Facility: CLINIC | Age: 50
End: 2020-03-09
Payer: COMMERCIAL

## 2020-03-09 VITALS
SYSTOLIC BLOOD PRESSURE: 124 MMHG | BODY MASS INDEX: 31.73 KG/M2 | HEIGHT: 67 IN | WEIGHT: 202.19 LBS | DIASTOLIC BLOOD PRESSURE: 70 MMHG

## 2020-03-09 DIAGNOSIS — Z90.710 STATUS POST LAPAROSCOPIC HYSTERECTOMY: Primary | ICD-10-CM

## 2020-03-09 PROCEDURE — 99024 PR POST-OP FOLLOW-UP VISIT: ICD-10-PCS | Mod: S$GLB,,, | Performed by: OBSTETRICS & GYNECOLOGY

## 2020-03-09 PROCEDURE — 99999 PR PBB SHADOW E&M-EST. PATIENT-LVL III: ICD-10-PCS | Mod: PBBFAC,,,

## 2020-03-09 PROCEDURE — 99024 POSTOP FOLLOW-UP VISIT: CPT | Mod: S$GLB,,, | Performed by: OBSTETRICS & GYNECOLOGY

## 2020-03-09 PROCEDURE — 99999 PR PBB SHADOW E&M-EST. PATIENT-LVL III: CPT | Mod: PBBFAC,,,

## 2020-04-03 ENCOUNTER — OFFICE VISIT (OUTPATIENT)
Dept: OBSTETRICS AND GYNECOLOGY | Facility: CLINIC | Age: 50
End: 2020-04-03
Payer: COMMERCIAL

## 2020-04-03 ENCOUNTER — TELEPHONE (OUTPATIENT)
Dept: OBSTETRICS AND GYNECOLOGY | Facility: CLINIC | Age: 50
End: 2020-04-03

## 2020-04-03 VITALS
WEIGHT: 196 LBS | SYSTOLIC BLOOD PRESSURE: 120 MMHG | BODY MASS INDEX: 30.76 KG/M2 | DIASTOLIC BLOOD PRESSURE: 82 MMHG | HEIGHT: 67 IN

## 2020-04-03 DIAGNOSIS — Z90.710 STATUS POST LAPAROSCOPIC HYSTERECTOMY: Primary | ICD-10-CM

## 2020-04-03 PROCEDURE — 99024 POSTOP FOLLOW-UP VISIT: CPT | Mod: S$GLB,,, | Performed by: OBSTETRICS & GYNECOLOGY

## 2020-04-03 PROCEDURE — 99024 PR POST-OP FOLLOW-UP VISIT: ICD-10-PCS | Mod: S$GLB,,, | Performed by: OBSTETRICS & GYNECOLOGY

## 2020-04-03 PROCEDURE — 99999 PR PBB SHADOW E&M-EST. PATIENT-LVL III: ICD-10-PCS | Mod: PBBFAC,,, | Performed by: OBSTETRICS & GYNECOLOGY

## 2020-04-03 PROCEDURE — 99999 PR PBB SHADOW E&M-EST. PATIENT-LVL III: CPT | Mod: PBBFAC,,, | Performed by: OBSTETRICS & GYNECOLOGY

## 2020-04-03 NOTE — TELEPHONE ENCOUNTER
----- Message from Sabrina Martel sent at 4/3/2020 10:40 AM CDT -----  Contact: pt  Type:  Patient Returning Call    Who Called: Kimmy Louis  Who Left Message for Patient: Nurse  Does the patient know what this is regarding?: Appointment   Would the patient rather a call back or a response via [facility]? Call back  Best Call Back Number: [phone]  Additional Information:

## 2020-04-03 NOTE — PROGRESS NOTES
Subjective:       Patient ID: Kimmy Louis is a 49 y.o. female.    Chief Complaint:  Post-op Evaluation      History of Present Illness  HPI  Pt is s/p RALH/Bilateral Salpingectomy/Mini-Laparotomy for specimen removal on 2020.  Pt is here for her routine post-op visit.  Pt is doing well and is happy with results.  Denies pain or vaginal bleeding.  Reports normal bowel and bladder function.  Denies intercourse or strenuous activity since surgery.      GYN & OB History  Patient's last menstrual period was 2020 (exact date).   Date of Last Pap: No result found    OB History    Para Term  AB Living   1 1 1     1   SAB TAB Ectopic Multiple Live Births                  # Outcome Date GA Lbr Grant/2nd Weight Sex Delivery Anes PTL Lv   1 Term                Review of Systems  Review of Systems   Constitutional: Negative for activity change, appetite change, chills, fatigue, fever and unexpected weight change.   Respiratory: Negative for shortness of breath.    Cardiovascular: Negative for chest pain, palpitations and leg swelling.   Gastrointestinal: Negative for abdominal pain, bloating, blood in stool, constipation, diarrhea, nausea and vomiting.   Genitourinary: Negative for dysuria, flank pain, frequency, genital sores, hematuria, hot flashes, pelvic pain, urgency, vaginal bleeding, vaginal discharge, vaginal pain, urinary incontinence, vaginal dryness and vaginal odor.   Musculoskeletal: Positive for back pain.   Neurological: Negative for syncope and headaches.           Objective:    Physical Exam:   Constitutional: She is oriented to person, place, and time. She appears well-developed and well-nourished. No distress.       Cardiovascular: Normal rate, regular rhythm and normal heart sounds.     Pulmonary/Chest: Effort normal and breath sounds normal.        Abdominal: Soft. Bowel sounds are normal. She exhibits abdominal incision (all wounds healed well). She exhibits no distension and  no mass. There is no tenderness. There is no rebound and no guarding. No hernia.     Genitourinary: Vagina normal. Pelvic exam was performed with patient supine. There is no rash, tenderness, lesion or injury on the right labia. There is no rash, tenderness, lesion or injury on the left labia. Uterus is absent. Right adnexum displays no mass, no tenderness and no fullness. Left adnexum displays no mass, no tenderness and no fullness. No erythema, tenderness or bleeding in the vagina. No foreign body in the vagina. No signs of injury around the vagina. No vaginal discharge found. Vaginal cuff normal.Cervix exhibits absence.           Musculoskeletal: Normal range of motion and moves all extremeties.       Neurological: She is alert and oriented to person, place, and time.    Skin: Skin is warm and dry.    Psychiatric: She has a normal mood and affect. Her behavior is normal. Thought content normal.          Assessment:        1. Status post laparoscopic hysterectomy             Plan:      Status post laparoscopic hysterectomy  -     Pt doing well.  Pathology reviewed with pt (benign).  Pt cleared to return to most activities and cleared to return to work at 8 week post-op fabiola.  Continue with pelvic rest to complete 12 weeks Post-op.      Follow up for Annual exam.

## 2020-05-15 ENCOUNTER — TELEPHONE (OUTPATIENT)
Dept: OBSTETRICS AND GYNECOLOGY | Facility: CLINIC | Age: 50
End: 2020-05-15

## 2020-05-15 NOTE — TELEPHONE ENCOUNTER
Attempted to reach patient.  She is checked in for her telemed visit so I did not leave a message.

## 2020-05-15 NOTE — TELEPHONE ENCOUNTER
----- Message from Ina Mercado sent at 5/15/2020  9:48 AM CDT -----  Contact: Kimmy [phone]  Type: Patient Call Back    Who called:Kimmy     What is the request in detail: The patient is returning a call to the staff in regards to her appt.    Can the clinic reply by [facility]?no    Would the patient rather a call back or a response via [facility]? Call back     Best call back number:[phone]

## 2021-04-28 ENCOUNTER — PATIENT MESSAGE (OUTPATIENT)
Dept: RESEARCH | Facility: HOSPITAL | Age: 51
End: 2021-04-28

## 2021-09-03 ENCOUNTER — HOSPITAL ENCOUNTER (OUTPATIENT)
Dept: RADIOLOGY | Facility: HOSPITAL | Age: 51
Discharge: HOME | End: 2021-09-03
Payer: COMMERCIAL

## 2021-09-03 VITALS — BODY MASS INDEX: 30.76 KG/M2 | HEIGHT: 67 IN | WEIGHT: 196 LBS

## 2021-09-03 DIAGNOSIS — Z12.31 ENCOUNTER FOR SCREENING MAMMOGRAM FOR MALIGNANT NEOPLASM OF BREAST: ICD-10-CM

## 2021-09-03 PROCEDURE — 77067 SCR MAMMO BI INCL CAD: CPT | Mod: 26,,, | Performed by: RADIOLOGY

## 2021-09-03 PROCEDURE — 77063 MAMMO DIGITAL SCREENING BILAT WITH TOMO: ICD-10-PCS | Mod: 26,,, | Performed by: RADIOLOGY

## 2021-09-03 PROCEDURE — 77067 MAMMO DIGITAL SCREENING BILAT WITH TOMO: ICD-10-PCS | Mod: 26,,, | Performed by: RADIOLOGY

## 2021-09-03 PROCEDURE — 77067 SCR MAMMO BI INCL CAD: CPT | Mod: TC

## 2021-09-03 PROCEDURE — 77063 BREAST TOMOSYNTHESIS BI: CPT | Mod: 26,,, | Performed by: RADIOLOGY

## 2022-12-10 ENCOUNTER — HOSPITAL ENCOUNTER (EMERGENCY)
Facility: HOSPITAL | Age: 52
Discharge: HOME | End: 2022-12-10
Attending: EMERGENCY MEDICINE
Payer: COMMERCIAL

## 2022-12-10 VITALS
WEIGHT: 213.88 LBS | RESPIRATION RATE: 20 BRPM | HEART RATE: 80 BPM | BODY MASS INDEX: 33.49 KG/M2 | DIASTOLIC BLOOD PRESSURE: 56 MMHG | OXYGEN SATURATION: 97 % | SYSTOLIC BLOOD PRESSURE: 139 MMHG | TEMPERATURE: 99 F
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
## 2022-12-10 PROCEDURE — 99284 EMERGENCY DEPT VISIT MOD MDM: CPT | Mod: 25

## 2022-12-10 PROCEDURE — 25000003 PHARM REV CODE 250: Performed by: NURSE PRACTITIONER

## 2022-12-10 RX ORDER — KETOROLAC TROMETHAMINE 10 MG/1
10 TABLET, FILM COATED ORAL 3 TIMES DAILY
Qty: 15 TABLET | Refills: 0 | Status: SHIPPED | OUTPATIENT
Start: 2022-12-10 | End: 2022-12-15

## 2022-12-10 RX ORDER — HYDROCODONE BITARTRATE AND ACETAMINOPHEN 5; 325 MG/1; MG/1
1 TABLET ORAL
Status: COMPLETED | OUTPATIENT
Start: 2022-12-10 | End: 2022-12-10

## 2022-12-10 RX ORDER — CYCLOBENZAPRINE HCL 5 MG
10 TABLET ORAL
Status: COMPLETED | OUTPATIENT
Start: 2022-12-10 | End: 2022-12-10

## 2022-12-10 RX ORDER — KETOROLAC TROMETHAMINE 10 MG/1
10 TABLET, FILM COATED ORAL
Status: COMPLETED | OUTPATIENT
Start: 2022-12-10 | End: 2022-12-10

## 2022-12-10 RX ORDER — HYDROCODONE BITARTRATE AND ACETAMINOPHEN 7.5; 325 MG/1; MG/1
1 TABLET ORAL EVERY 6 HOURS PRN
Qty: 11 TABLET | Refills: 0 | Status: SHIPPED | OUTPATIENT
Start: 2022-12-10 | End: 2022-12-18

## 2022-12-10 RX ORDER — CYCLOBENZAPRINE HCL 10 MG
10 TABLET ORAL 3 TIMES DAILY PRN
Qty: 15 TABLET | Refills: 0 | Status: SHIPPED | OUTPATIENT
Start: 2022-12-10 | End: 2022-12-15

## 2022-12-10 RX ADMIN — KETOROLAC TROMETHAMINE 10 MG: 10 TABLET, FILM COATED ORAL at 04:12

## 2022-12-10 RX ADMIN — CYCLOBENZAPRINE HYDROCHLORIDE 10 MG: 5 TABLET, FILM COATED ORAL at 04:12

## 2022-12-10 RX ADMIN — HYDROCODONE BITARTRATE AND ACETAMINOPHEN 1 TABLET: 5; 325 TABLET ORAL at 04:12
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
## 2022-12-10 NOTE — Clinical Note
"Kimmy Larabo Louis was seen and treated in our emergency department on 12/10/2022.  She may return to work on 12/13/2022.       If you have any questions or concerns, please don't hesitate to call.      Toy Long NP"

## (undated) DEVICE — SYR 50CC LL

## (undated) DEVICE — SUT ABS CLIP LAPRA-TY CTD

## (undated) DEVICE — COVER OVERHEAD SURG LT BLUE

## (undated) DEVICE — COVER TIP CURVED SCISSORS XI

## (undated) DEVICE — SOL 9P NACL IRR PIC IL

## (undated) DEVICE — POSITIONER HEAD DONUT 9IN FOAM

## (undated) DEVICE — SUPPORT ULNA NERVE PROTECTOR

## (undated) DEVICE — MANIPULATOR TIP RUMI ORANGE

## (undated) DEVICE — TUBING HEATED INSUFFLATOR

## (undated) DEVICE — GLOVE BIOGEL PI MICRO SZ 6.5

## (undated) DEVICE — SEE MEDLINE ITEM 157181

## (undated) DEVICE — SEE MEDLINE ITEM 157131

## (undated) DEVICE — EVACUATOR KIT SMOKE PLUME AWAY

## (undated) DEVICE — DRAPE ARM DAVINCI XI

## (undated) DEVICE — GLOVE SURGICAL LATEX SZ 7

## (undated) DEVICE — IRRIGATOR ENDOWRIST XI SUCTION

## (undated) DEVICE — SOL ELECTROLUBE ANTI-STIC

## (undated) DEVICE — SEAL UNIVERSAL 5MM-8MM XI

## (undated) DEVICE — SEE MEDLINE ITEM 154981

## (undated) DEVICE — SYR 10CC LUER LOCK

## (undated) DEVICE — GOWN SURG 2XL DISP TIE BACK

## (undated) DEVICE — GLOVE SURG BIOGEL LATEX SZ 7.5

## (undated) DEVICE — IRRIGATOR ENDOSCOPY DISP.

## (undated) DEVICE — SUT MONOCRYL 4-0 PS-1 UND

## (undated) DEVICE — DRAPE LAVH LAPAROSCOPY W/FLUID

## (undated) DEVICE — SEE MEDLINE ITEM 152622

## (undated) DEVICE — ADHESIVE DERMABOND ADVANCED

## (undated) DEVICE — ELECTRODE REM PLYHSV RETURN 9

## (undated) DEVICE — SEE MEDLINE ITEM 157027

## (undated) DEVICE — OBTURATOR BLADELESS 8MM XI CLR

## (undated) DEVICE — UNDERGLOVES BIOGEL PI SIZE 8

## (undated) DEVICE — UNDERGLOVE BIOGEL PI SZ 6.5 LF

## (undated) DEVICE — SYR 3CC LUER LOC

## (undated) DEVICE — OCCLUDER COLPO-PNEUMO STERILE

## (undated) DEVICE — SEE MEDLINE ITEM 157117

## (undated) DEVICE — SEE MEDLINE ITEM 146292

## (undated) DEVICE — KIT ANTIFOG

## (undated) DEVICE — APPLICATOR CHLORAPREP ORN 26ML

## (undated) DEVICE — NDL PNEUMO INSUFFLATI 120MM

## (undated) DEVICE — DRAPE COLUMN DAVINCI XI

## (undated) DEVICE — Device

## (undated) DEVICE — DRAPE STERI LONG

## (undated) DEVICE — SOL NS 1000CC